# Patient Record
Sex: FEMALE | Race: WHITE | Employment: OTHER | URBAN - METROPOLITAN AREA
[De-identification: names, ages, dates, MRNs, and addresses within clinical notes are randomized per-mention and may not be internally consistent; named-entity substitution may affect disease eponyms.]

---

## 2021-10-06 ENCOUNTER — ANESTHESIA EVENT (OUTPATIENT)
Dept: OPERATING ROOM | Age: 76
DRG: 473 | End: 2021-10-06
Payer: MEDICARE

## 2021-10-06 NOTE — PROGRESS NOTES
PRE-OP INSTRUCTIONS FOR THE SURGICAL PATIENT YOU ARE UNABLE TO MAKE CONTACT FOR AN INTERVIEW:    All patients having surgery or anesthesia are required to be Covid tested OR to have been vaccinated at least 14 days prior to your procedure. It is very important to return our call to [phone] and notify the staff of your last vaccination date otherwise you will be required to complete Covid PCR test within the 5-6 days prior to surgery & quarantine. The results will need to be faxed to PreAdmission Testing at [phone]. 1. Follow all instructions provided to you from your surgeons office, including your ARRIVAL TIME. 2. Enter the MAIN entrance located on [address] and report to the desk. 3. Bring your insurance & photo ID with you. You may also be asked to pay a co-pay, as you may want to bring a check or credit card with you. 4. Leave all other valuables at home. 5. Arrange for someone to drive you home and be with you for the first 24 hours after discharge. 6. Bring your medication list with you day of surgery with doses and frequency listed (including over the counter medications)  7. You must contact your surgeon for Instructions regarding:              - ALL medication instructions, especially if taking blood thinners, aspirin, or diabetic medication.         -Bariatric patients call surgeon if on diabetic medications as some need to be stopped 1 week preop  - IF  there is a change in your physical condition such as a cold, fever, rash, cuts, sores or any other infection, especially near your surgical site. 8. A Pre-op History and Physical for surgery MUST be completed by your Physician or an Urgent Care within 30 days of your procedure date. Please bring a copy with you on the day of your procedure and along with any other testing performed. 9. DO NOT EAT ANYTHING eight hours prior to arrival for surgery.   May have up to 8 ounces of water 4 hours prior to arrival for surgery. NOTE: ALL Gastric, Bariatric and Bowel surgery patients MUST follow their surgeon's instructions. 10. No gum, candy, mints, or ice chips day of procedure. 11. Please refrain from drinking alcohol the day before or day of your procedure. 12. Please do not smoke the day of your procedure. 13. Dress in loose, comfortable clothing appropriate for redressing after your procedure. Do not wear jewelry (including body piercings), make-up, fingernail polish, lotion, powders or metal hairclips. 15. Contacts will need to be removed prior to surgery. You may want to bring your eye glasses to wear immediately before and after surgery. 14. Dentures will need to be removed before your procedure. 13. Bring cases for your glasses, contacts, dentures, or hearing aids to protect them while you are in surgery. 16. If you use a CPAP, please bring it with you on the day of your procedure. 17. Do not shave or wax for 72 hours prior to procedure near your operative site  18. FOR WOMAN OF CHILDBEARING AGE ONLY- please make sure we can collect a urine sample on arrival.     If you have further questions, you may contact your surgeon's office or us at [phone]     Left instructions on patient's voicemail.     Citlalli Bustamante RN.10/6/2021 .10:03 AM

## 2021-10-07 ENCOUNTER — APPOINTMENT (OUTPATIENT)
Dept: GENERAL RADIOLOGY | Age: 76
DRG: 473 | End: 2021-10-07
Attending: NEUROLOGICAL SURGERY
Payer: MEDICARE

## 2021-10-07 ENCOUNTER — HOSPITAL ENCOUNTER (INPATIENT)
Age: 76
LOS: 1 days | Discharge: HOME HEALTH | DRG: 473 | End: 2021-10-09
Attending: NEUROLOGICAL SURGERY | Admitting: NEUROLOGICAL SURGERY
Payer: MEDICARE

## 2021-10-07 ENCOUNTER — ANESTHESIA (OUTPATIENT)
Dept: OPERATING ROOM | Age: 76
DRG: 473 | End: 2021-10-07
Payer: MEDICARE

## 2021-10-07 VITALS — TEMPERATURE: 96.1 F | DIASTOLIC BLOOD PRESSURE: 78 MMHG | OXYGEN SATURATION: 99 % | SYSTOLIC BLOOD PRESSURE: 147 MMHG

## 2021-10-07 DIAGNOSIS — M48.02 CERVICAL STENOSIS OF SPINAL CANAL: Primary | ICD-10-CM

## 2021-10-07 PROCEDURE — 2580000003 HC RX 258: Performed by: NEUROLOGICAL SURGERY

## 2021-10-07 PROCEDURE — 2580000003 HC RX 258: Performed by: NURSE ANESTHETIST, CERTIFIED REGISTERED

## 2021-10-07 PROCEDURE — 6360000002 HC RX W HCPCS: Performed by: ANESTHESIOLOGY

## 2021-10-07 PROCEDURE — 6360000002 HC RX W HCPCS: Performed by: NURSE ANESTHETIST, CERTIFIED REGISTERED

## 2021-10-07 PROCEDURE — 01N10ZZ RELEASE CERVICAL NERVE, OPEN APPROACH: ICD-10-PCS | Performed by: NEUROLOGICAL SURGERY

## 2021-10-07 PROCEDURE — 2500000003 HC RX 250 WO HCPCS: Performed by: NURSE ANESTHETIST, CERTIFIED REGISTERED

## 2021-10-07 PROCEDURE — 0RT30ZZ RESECTION OF CERVICAL VERTEBRAL DISC, OPEN APPROACH: ICD-10-PCS | Performed by: NEUROLOGICAL SURGERY

## 2021-10-07 PROCEDURE — 00NW0ZZ RELEASE CERVICAL SPINAL CORD, OPEN APPROACH: ICD-10-PCS | Performed by: NEUROLOGICAL SURGERY

## 2021-10-07 PROCEDURE — 3600000004 HC SURGERY LEVEL 4 BASE: Performed by: NEUROLOGICAL SURGERY

## 2021-10-07 PROCEDURE — 3700000001 HC ADD 15 MINUTES (ANESTHESIA): Performed by: NEUROLOGICAL SURGERY

## 2021-10-07 PROCEDURE — 2500000003 HC RX 250 WO HCPCS: Performed by: ANESTHESIOLOGY

## 2021-10-07 PROCEDURE — 2580000003 HC RX 258: Performed by: PHYSICIAN ASSISTANT

## 2021-10-07 PROCEDURE — 2580000003 HC RX 258: Performed by: ANESTHESIOLOGY

## 2021-10-07 PROCEDURE — 51798 US URINE CAPACITY MEASURE: CPT

## 2021-10-07 PROCEDURE — 2500000003 HC RX 250 WO HCPCS: Performed by: PHYSICIAN ASSISTANT

## 2021-10-07 PROCEDURE — 0RG20A0 FUSION OF 2 OR MORE CERVICAL VERTEBRAL JOINTS WITH INTERBODY FUSION DEVICE, ANTERIOR APPROACH, ANTERIOR COLUMN, OPEN APPROACH: ICD-10-PCS | Performed by: NEUROLOGICAL SURGERY

## 2021-10-07 PROCEDURE — 6360000002 HC RX W HCPCS: Performed by: PHYSICIAN ASSISTANT

## 2021-10-07 PROCEDURE — C1713 ANCHOR/SCREW BN/BN,TIS/BN: HCPCS | Performed by: NEUROLOGICAL SURGERY

## 2021-10-07 PROCEDURE — 2720000010 HC SURG SUPPLY STERILE: Performed by: NEUROLOGICAL SURGERY

## 2021-10-07 PROCEDURE — 6370000000 HC RX 637 (ALT 250 FOR IP): Performed by: PHYSICIAN ASSISTANT

## 2021-10-07 PROCEDURE — 72040 X-RAY EXAM NECK SPINE 2-3 VW: CPT

## 2021-10-07 PROCEDURE — 6360000002 HC RX W HCPCS: Performed by: NEUROLOGICAL SURGERY

## 2021-10-07 PROCEDURE — G0378 HOSPITAL OBSERVATION PER HR: HCPCS

## 2021-10-07 PROCEDURE — 6370000000 HC RX 637 (ALT 250 FOR IP): Performed by: INTERNAL MEDICINE

## 2021-10-07 PROCEDURE — 3209999900 FLUORO FOR SURGICAL PROCEDURES

## 2021-10-07 PROCEDURE — 3700000000 HC ANESTHESIA ATTENDED CARE: Performed by: NEUROLOGICAL SURGERY

## 2021-10-07 PROCEDURE — 2500000003 HC RX 250 WO HCPCS: Performed by: NEUROLOGICAL SURGERY

## 2021-10-07 PROCEDURE — 7100000000 HC PACU RECOVERY - FIRST 15 MIN: Performed by: NEUROLOGICAL SURGERY

## 2021-10-07 PROCEDURE — 7100000001 HC PACU RECOVERY - ADDTL 15 MIN: Performed by: NEUROLOGICAL SURGERY

## 2021-10-07 PROCEDURE — 2709999900 HC NON-CHARGEABLE SUPPLY: Performed by: NEUROLOGICAL SURGERY

## 2021-10-07 PROCEDURE — 3600000014 HC SURGERY LEVEL 4 ADDTL 15MIN: Performed by: NEUROLOGICAL SURGERY

## 2021-10-07 PROCEDURE — 2780000010 HC IMPLANT OTHER: Performed by: NEUROLOGICAL SURGERY

## 2021-10-07 PROCEDURE — 6370000000 HC RX 637 (ALT 250 FOR IP): Performed by: NURSE PRACTITIONER

## 2021-10-07 DEVICE — IMPLANTABLE DEVICE
Type: IMPLANTABLE DEVICE | Site: SPINE CERVICAL | Status: FUNCTIONAL
Brand: VISTA®-S

## 2021-10-07 DEVICE — IMPLANTABLE DEVICE
Type: IMPLANTABLE DEVICE | Site: SPINE CERVICAL | Status: FUNCTIONAL
Brand: TRINICA® TRINICA®

## 2021-10-07 DEVICE — GRAFT BONE PRIMAGEN 5CC: Type: IMPLANTABLE DEVICE | Site: SPINE CERVICAL | Status: FUNCTIONAL

## 2021-10-07 DEVICE — IMPLANTABLE DEVICE
Type: IMPLANTABLE DEVICE | Site: SPINE CERVICAL | Status: FUNCTIONAL
Brand: TRINICA®

## 2021-10-07 RX ORDER — OXYCODONE HYDROCHLORIDE 5 MG/1
10 TABLET ORAL PRN
Status: DISCONTINUED | OUTPATIENT
Start: 2021-10-07 | End: 2021-10-07 | Stop reason: HOSPADM

## 2021-10-07 RX ORDER — SODIUM CHLORIDE 0.9 % (FLUSH) 0.9 %
10 SYRINGE (ML) INJECTION PRN
Status: DISCONTINUED | OUTPATIENT
Start: 2021-10-07 | End: 2021-10-09 | Stop reason: HOSPADM

## 2021-10-07 RX ORDER — MIDAZOLAM HYDROCHLORIDE 1 MG/ML
INJECTION INTRAMUSCULAR; INTRAVENOUS PRN
Status: DISCONTINUED | OUTPATIENT
Start: 2021-10-07 | End: 2021-10-07 | Stop reason: SDUPTHER

## 2021-10-07 RX ORDER — GLYCOPYRROLATE 1 MG/5 ML
SYRINGE (ML) INTRAVENOUS PRN
Status: DISCONTINUED | OUTPATIENT
Start: 2021-10-07 | End: 2021-10-07 | Stop reason: SDUPTHER

## 2021-10-07 RX ORDER — ONDANSETRON 2 MG/ML
INJECTION INTRAMUSCULAR; INTRAVENOUS PRN
Status: DISCONTINUED | OUTPATIENT
Start: 2021-10-07 | End: 2021-10-07 | Stop reason: SDUPTHER

## 2021-10-07 RX ORDER — LOSARTAN POTASSIUM 100 MG/1
100 TABLET ORAL DAILY
COMMUNITY

## 2021-10-07 RX ORDER — PHENYLEPHRINE HYDROCHLORIDE 10 MG/ML
INJECTION INTRAVENOUS PRN
Status: DISCONTINUED | OUTPATIENT
Start: 2021-10-07 | End: 2021-10-07 | Stop reason: SDUPTHER

## 2021-10-07 RX ORDER — MORPHINE SULFATE 4 MG/ML
1 INJECTION, SOLUTION INTRAMUSCULAR; INTRAVENOUS EVERY 5 MIN PRN
Status: DISCONTINUED | OUTPATIENT
Start: 2021-10-07 | End: 2021-10-07 | Stop reason: HOSPADM

## 2021-10-07 RX ORDER — DIPHENHYDRAMINE HYDROCHLORIDE 50 MG/ML
12.5 INJECTION INTRAMUSCULAR; INTRAVENOUS
Status: DISCONTINUED | OUTPATIENT
Start: 2021-10-07 | End: 2021-10-07 | Stop reason: HOSPADM

## 2021-10-07 RX ORDER — METHOCARBAMOL 500 MG/1
500 TABLET, FILM COATED ORAL 4 TIMES DAILY PRN
Status: DISCONTINUED | OUTPATIENT
Start: 2021-10-08 | End: 2021-10-09 | Stop reason: HOSPADM

## 2021-10-07 RX ORDER — SENNA AND DOCUSATE SODIUM 50; 8.6 MG/1; MG/1
1 TABLET, FILM COATED ORAL 2 TIMES DAILY
Status: DISCONTINUED | OUTPATIENT
Start: 2021-10-07 | End: 2021-10-09 | Stop reason: HOSPADM

## 2021-10-07 RX ORDER — POLYETHYLENE GLYCOL 3350 17 G/17G
17 POWDER, FOR SOLUTION ORAL DAILY PRN
Status: DISCONTINUED | OUTPATIENT
Start: 2021-10-07 | End: 2021-10-07

## 2021-10-07 RX ORDER — POLYETHYLENE GLYCOL 3350 17 G/17G
17 POWDER, FOR SOLUTION ORAL DAILY
Status: DISCONTINUED | OUTPATIENT
Start: 2021-10-07 | End: 2021-10-09 | Stop reason: HOSPADM

## 2021-10-07 RX ORDER — ACETAMINOPHEN 325 MG/1
650 TABLET ORAL EVERY 6 HOURS
Status: DISCONTINUED | OUTPATIENT
Start: 2021-10-07 | End: 2021-10-09 | Stop reason: HOSPADM

## 2021-10-07 RX ORDER — SODIUM PHOSPHATE, DIBASIC AND SODIUM PHOSPHATE, MONOBASIC 7; 19 G/133ML; G/133ML
1 ENEMA RECTAL DAILY PRN
Status: DISCONTINUED | OUTPATIENT
Start: 2021-10-07 | End: 2021-10-09 | Stop reason: HOSPADM

## 2021-10-07 RX ORDER — HEPARIN SODIUM 5000 [USP'U]/.5ML
5000 INJECTION, SOLUTION INTRAVENOUS; SUBCUTANEOUS EVERY 8 HOURS
Status: DISCONTINUED | OUTPATIENT
Start: 2021-10-08 | End: 2021-10-07

## 2021-10-07 RX ORDER — DEXAMETHASONE SODIUM PHOSPHATE 4 MG/ML
INJECTION, SOLUTION INTRA-ARTICULAR; INTRALESIONAL; INTRAMUSCULAR; INTRAVENOUS; SOFT TISSUE PRN
Status: DISCONTINUED | OUTPATIENT
Start: 2021-10-07 | End: 2021-10-07 | Stop reason: SDUPTHER

## 2021-10-07 RX ORDER — PROMETHAZINE HYDROCHLORIDE 25 MG/ML
6.25 INJECTION, SOLUTION INTRAMUSCULAR; INTRAVENOUS
Status: DISCONTINUED | OUTPATIENT
Start: 2021-10-07 | End: 2021-10-07 | Stop reason: HOSPADM

## 2021-10-07 RX ORDER — HYDRALAZINE HYDROCHLORIDE 20 MG/ML
10 INJECTION INTRAMUSCULAR; INTRAVENOUS EVERY 6 HOURS PRN
Status: DISCONTINUED | OUTPATIENT
Start: 2021-10-07 | End: 2021-10-09 | Stop reason: HOSPADM

## 2021-10-07 RX ORDER — PROPOFOL 10 MG/ML
INJECTION, EMULSION INTRAVENOUS CONTINUOUS PRN
Status: DISCONTINUED | OUTPATIENT
Start: 2021-10-07 | End: 2021-10-07 | Stop reason: SDUPTHER

## 2021-10-07 RX ORDER — SODIUM CHLORIDE, SODIUM LACTATE, POTASSIUM CHLORIDE, CALCIUM CHLORIDE 600; 310; 30; 20 MG/100ML; MG/100ML; MG/100ML; MG/100ML
INJECTION, SOLUTION INTRAVENOUS CONTINUOUS PRN
Status: DISCONTINUED | OUTPATIENT
Start: 2021-10-07 | End: 2021-10-07 | Stop reason: SDUPTHER

## 2021-10-07 RX ORDER — FENTANYL CITRATE 50 UG/ML
INJECTION, SOLUTION INTRAMUSCULAR; INTRAVENOUS PRN
Status: DISCONTINUED | OUTPATIENT
Start: 2021-10-07 | End: 2021-10-07 | Stop reason: SDUPTHER

## 2021-10-07 RX ORDER — OXYCODONE HYDROCHLORIDE 5 MG/1
10 TABLET ORAL EVERY 4 HOURS PRN
Status: DISCONTINUED | OUTPATIENT
Start: 2021-10-07 | End: 2021-10-09 | Stop reason: HOSPADM

## 2021-10-07 RX ORDER — OXYCODONE HYDROCHLORIDE 5 MG/1
5 TABLET ORAL PRN
Status: DISCONTINUED | OUTPATIENT
Start: 2021-10-07 | End: 2021-10-07 | Stop reason: HOSPADM

## 2021-10-07 RX ORDER — PROMETHAZINE HYDROCHLORIDE 12.5 MG/1
12.5 TABLET ORAL EVERY 6 HOURS PRN
Status: DISCONTINUED | OUTPATIENT
Start: 2021-10-07 | End: 2021-10-09 | Stop reason: HOSPADM

## 2021-10-07 RX ORDER — VITAMIN E 268 MG
400 CAPSULE ORAL DAILY
Status: ON HOLD | COMMUNITY
End: 2021-10-09 | Stop reason: HOSPADM

## 2021-10-07 RX ORDER — BENZONATATE 100 MG/1
100 CAPSULE ORAL 3 TIMES DAILY PRN
Status: DISCONTINUED | OUTPATIENT
Start: 2021-10-07 | End: 2021-10-09 | Stop reason: HOSPADM

## 2021-10-07 RX ORDER — LABETALOL HYDROCHLORIDE 5 MG/ML
5 INJECTION, SOLUTION INTRAVENOUS EVERY 10 MIN PRN
Status: DISCONTINUED | OUTPATIENT
Start: 2021-10-07 | End: 2021-10-07 | Stop reason: HOSPADM

## 2021-10-07 RX ORDER — LIDOCAINE HCL/PF 100 MG/5ML
SYRINGE (ML) INJECTION PRN
Status: DISCONTINUED | OUTPATIENT
Start: 2021-10-07 | End: 2021-10-07 | Stop reason: SDUPTHER

## 2021-10-07 RX ORDER — SUCCINYLCHOLINE/SOD CL,ISO/PF 200MG/10ML
SYRINGE (ML) INTRAVENOUS PRN
Status: DISCONTINUED | OUTPATIENT
Start: 2021-10-07 | End: 2021-10-07 | Stop reason: SDUPTHER

## 2021-10-07 RX ORDER — SODIUM CHLORIDE, SODIUM LACTATE, POTASSIUM CHLORIDE, CALCIUM CHLORIDE 600; 310; 30; 20 MG/100ML; MG/100ML; MG/100ML; MG/100ML
INJECTION, SOLUTION INTRAVENOUS CONTINUOUS
Status: DISCONTINUED | OUTPATIENT
Start: 2021-10-07 | End: 2021-10-08

## 2021-10-07 RX ORDER — METOCLOPRAMIDE HYDROCHLORIDE 5 MG/ML
10 INJECTION INTRAMUSCULAR; INTRAVENOUS
Status: COMPLETED | OUTPATIENT
Start: 2021-10-07 | End: 2021-10-07

## 2021-10-07 RX ORDER — OXYCODONE HYDROCHLORIDE 5 MG/1
5 TABLET ORAL EVERY 4 HOURS PRN
Status: DISCONTINUED | OUTPATIENT
Start: 2021-10-07 | End: 2021-10-09 | Stop reason: HOSPADM

## 2021-10-07 RX ORDER — SODIUM CHLORIDE 0.9 % (FLUSH) 0.9 %
10 SYRINGE (ML) INJECTION EVERY 12 HOURS SCHEDULED
Status: DISCONTINUED | OUTPATIENT
Start: 2021-10-07 | End: 2021-10-09 | Stop reason: HOSPADM

## 2021-10-07 RX ORDER — ROCURONIUM BROMIDE 10 MG/ML
INJECTION, SOLUTION INTRAVENOUS PRN
Status: DISCONTINUED | OUTPATIENT
Start: 2021-10-07 | End: 2021-10-07 | Stop reason: SDUPTHER

## 2021-10-07 RX ORDER — SODIUM CHLORIDE, SODIUM LACTATE, POTASSIUM CHLORIDE, CALCIUM CHLORIDE 600; 310; 30; 20 MG/100ML; MG/100ML; MG/100ML; MG/100ML
INJECTION, SOLUTION INTRAVENOUS CONTINUOUS
Status: DISCONTINUED | OUTPATIENT
Start: 2021-10-07 | End: 2021-10-07

## 2021-10-07 RX ORDER — ONDANSETRON 2 MG/ML
4 INJECTION INTRAMUSCULAR; INTRAVENOUS EVERY 6 HOURS PRN
Status: DISCONTINUED | OUTPATIENT
Start: 2021-10-07 | End: 2021-10-09 | Stop reason: HOSPADM

## 2021-10-07 RX ORDER — LOSARTAN POTASSIUM 25 MG/1
100 TABLET ORAL DAILY
Status: DISCONTINUED | OUTPATIENT
Start: 2021-10-07 | End: 2021-10-09 | Stop reason: HOSPADM

## 2021-10-07 RX ORDER — MEPERIDINE HYDROCHLORIDE 25 MG/ML
12.5 INJECTION INTRAMUSCULAR; INTRAVENOUS; SUBCUTANEOUS EVERY 5 MIN PRN
Status: DISCONTINUED | OUTPATIENT
Start: 2021-10-07 | End: 2021-10-07 | Stop reason: HOSPADM

## 2021-10-07 RX ORDER — SODIUM CHLORIDE 9 MG/ML
25 INJECTION, SOLUTION INTRAVENOUS PRN
Status: DISCONTINUED | OUTPATIENT
Start: 2021-10-07 | End: 2021-10-09 | Stop reason: HOSPADM

## 2021-10-07 RX ORDER — HEPARIN SODIUM 5000 [USP'U]/ML
5000 INJECTION, SOLUTION INTRAVENOUS; SUBCUTANEOUS EVERY 8 HOURS
Status: DISCONTINUED | OUTPATIENT
Start: 2021-10-08 | End: 2021-10-09 | Stop reason: HOSPADM

## 2021-10-07 RX ORDER — HYDRALAZINE HYDROCHLORIDE 20 MG/ML
5 INJECTION INTRAMUSCULAR; INTRAVENOUS EVERY 10 MIN PRN
Status: DISCONTINUED | OUTPATIENT
Start: 2021-10-07 | End: 2021-10-07 | Stop reason: HOSPADM

## 2021-10-07 RX ORDER — LIDOCAINE HYDROCHLORIDE AND EPINEPHRINE 10; 10 MG/ML; UG/ML
INJECTION, SOLUTION INFILTRATION; PERINEURAL PRN
Status: DISCONTINUED | OUTPATIENT
Start: 2021-10-07 | End: 2021-10-07 | Stop reason: ALTCHOICE

## 2021-10-07 RX ORDER — PROPOFOL 10 MG/ML
INJECTION, EMULSION INTRAVENOUS PRN
Status: DISCONTINUED | OUTPATIENT
Start: 2021-10-07 | End: 2021-10-07 | Stop reason: SDUPTHER

## 2021-10-07 RX ADMIN — DOCUSATE SODIUM 50 MG AND SENNOSIDES 8.6 MG 1 TABLET: 8.6; 5 TABLET, FILM COATED ORAL at 12:46

## 2021-10-07 RX ADMIN — SODIUM CHLORIDE, POTASSIUM CHLORIDE, SODIUM LACTATE AND CALCIUM CHLORIDE: 600; 310; 30; 20 INJECTION, SOLUTION INTRAVENOUS at 09:15

## 2021-10-07 RX ADMIN — METHOCARBAMOL 500 MG: 100 INJECTION, SOLUTION INTRAMUSCULAR; INTRAVENOUS at 23:24

## 2021-10-07 RX ADMIN — Medication 140 MG: at 07:44

## 2021-10-07 RX ADMIN — DEXAMETHASONE SODIUM PHOSPHATE 10 MG: 4 INJECTION, SOLUTION INTRAMUSCULAR; INTRAVENOUS at 08:05

## 2021-10-07 RX ADMIN — FENTANYL CITRATE 25 MCG: 50 INJECTION, SOLUTION INTRAMUSCULAR; INTRAVENOUS at 07:44

## 2021-10-07 RX ADMIN — ACETAMINOPHEN 650 MG: 325 TABLET ORAL at 12:46

## 2021-10-07 RX ADMIN — SODIUM CHLORIDE, POTASSIUM CHLORIDE, SODIUM LACTATE AND CALCIUM CHLORIDE: 600; 310; 30; 20 INJECTION, SOLUTION INTRAVENOUS at 06:21

## 2021-10-07 RX ADMIN — LOSARTAN POTASSIUM 100 MG: 25 TABLET, FILM COATED ORAL at 12:46

## 2021-10-07 RX ADMIN — ACETAMINOPHEN 650 MG: 325 TABLET ORAL at 18:29

## 2021-10-07 RX ADMIN — PHENYLEPHRINE HYDROCHLORIDE 80 MCG: 10 INJECTION INTRAVENOUS at 08:00

## 2021-10-07 RX ADMIN — PROPOFOL 120 MCG/KG/MIN: 10 INJECTION, EMULSION INTRAVENOUS at 07:55

## 2021-10-07 RX ADMIN — SODIUM CHLORIDE, POTASSIUM CHLORIDE, SODIUM LACTATE AND CALCIUM CHLORIDE: 600; 310; 30; 20 INJECTION, SOLUTION INTRAVENOUS at 11:38

## 2021-10-07 RX ADMIN — ACETAMINOPHEN 650 MG: 325 TABLET ORAL at 23:24

## 2021-10-07 RX ADMIN — METHOCARBAMOL 500 MG: 100 INJECTION, SOLUTION INTRAMUSCULAR; INTRAVENOUS at 16:16

## 2021-10-07 RX ADMIN — FENTANYL CITRATE 75 MCG: 50 INJECTION, SOLUTION INTRAMUSCULAR; INTRAVENOUS at 08:08

## 2021-10-07 RX ADMIN — CEFAZOLIN 2000 MG: 10 INJECTION, POWDER, FOR SOLUTION INTRAVENOUS at 15:13

## 2021-10-07 RX ADMIN — Medication 0.2 MG: at 07:26

## 2021-10-07 RX ADMIN — REMIFENTANIL HYDROCHLORIDE 0.1 MCG/KG/MIN: 1 INJECTION, POWDER, LYOPHILIZED, FOR SOLUTION INTRAVENOUS at 07:55

## 2021-10-07 RX ADMIN — LABETALOL HYDROCHLORIDE 5 MG: 5 INJECTION, SOLUTION INTRAVENOUS at 10:54

## 2021-10-07 RX ADMIN — METOCLOPRAMIDE HYDROCHLORIDE 10 MG: 5 INJECTION INTRAMUSCULAR; INTRAVENOUS at 10:22

## 2021-10-07 RX ADMIN — Medication 100 MG: at 07:43

## 2021-10-07 RX ADMIN — METHOCARBAMOL 500 MG: 100 INJECTION, SOLUTION INTRAMUSCULAR; INTRAVENOUS at 10:20

## 2021-10-07 RX ADMIN — PROPOFOL 200 MG: 10 INJECTION, EMULSION INTRAVENOUS at 07:44

## 2021-10-07 RX ADMIN — MIDAZOLAM HYDROCHLORIDE 1 MG: 2 INJECTION, SOLUTION INTRAMUSCULAR; INTRAVENOUS at 07:26

## 2021-10-07 RX ADMIN — Medication 0.2 MG: at 09:15

## 2021-10-07 RX ADMIN — FAMOTIDINE 20 MG: 10 INJECTION, SOLUTION INTRAVENOUS at 12:45

## 2021-10-07 RX ADMIN — BENZONATATE 100 MG: 100 CAPSULE ORAL at 21:03

## 2021-10-07 RX ADMIN — OXYCODONE 5 MG: 5 TABLET ORAL at 15:13

## 2021-10-07 RX ADMIN — SODIUM CHLORIDE, POTASSIUM CHLORIDE, SODIUM LACTATE AND CALCIUM CHLORIDE: 600; 310; 30; 20 INJECTION, SOLUTION INTRAVENOUS at 15:12

## 2021-10-07 RX ADMIN — Medication 10 ML: at 20:10

## 2021-10-07 RX ADMIN — ONDANSETRON 4 MG: 2 INJECTION INTRAMUSCULAR; INTRAVENOUS at 08:05

## 2021-10-07 RX ADMIN — HYDROMORPHONE HYDROCHLORIDE 0.5 MG: 1 INJECTION, SOLUTION INTRAMUSCULAR; INTRAVENOUS; SUBCUTANEOUS at 10:10

## 2021-10-07 RX ADMIN — ROCURONIUM BROMIDE 5 MG: 10 INJECTION INTRAVENOUS at 07:44

## 2021-10-07 RX ADMIN — PROPOFOL 130 MCG/KG/MIN: 10 INJECTION, EMULSION INTRAVENOUS at 09:00

## 2021-10-07 RX ADMIN — PHENYLEPHRINE HYDROCHLORIDE 80 MCG: 10 INJECTION INTRAVENOUS at 08:38

## 2021-10-07 RX ADMIN — HYDROMORPHONE HYDROCHLORIDE 0.25 MG: 1 INJECTION, SOLUTION INTRAMUSCULAR; INTRAVENOUS; SUBCUTANEOUS at 10:02

## 2021-10-07 RX ADMIN — HYDROMORPHONE HYDROCHLORIDE 0.25 MG: 1 INJECTION, SOLUTION INTRAMUSCULAR; INTRAVENOUS; SUBCUTANEOUS at 10:39

## 2021-10-07 RX ADMIN — POLYETHYLENE GLYCOL 3350 17 G: 17 POWDER, FOR SOLUTION ORAL at 12:45

## 2021-10-07 RX ADMIN — PHENYLEPHRINE HYDROCHLORIDE 80 MCG: 10 INJECTION INTRAVENOUS at 07:55

## 2021-10-07 RX ADMIN — ROCURONIUM BROMIDE 35 MG: 10 INJECTION INTRAVENOUS at 08:05

## 2021-10-07 RX ADMIN — SODIUM CHLORIDE, SODIUM LACTATE, POTASSIUM CHLORIDE, AND CALCIUM CHLORIDE: .6; .31; .03; .02 INJECTION, SOLUTION INTRAVENOUS at 07:55

## 2021-10-07 RX ADMIN — DOCUSATE SODIUM 50 MG AND SENNOSIDES 8.6 MG 1 TABLET: 8.6; 5 TABLET, FILM COATED ORAL at 20:09

## 2021-10-07 RX ADMIN — PHENYLEPHRINE HYDROCHLORIDE 80 MCG: 10 INJECTION INTRAVENOUS at 09:15

## 2021-10-07 RX ADMIN — FAMOTIDINE 20 MG: 10 INJECTION, SOLUTION INTRAVENOUS at 20:08

## 2021-10-07 RX ADMIN — PHENOL 1 SPRAY: 1.5 LIQUID ORAL at 20:08

## 2021-10-07 RX ADMIN — OXYCODONE 5 MG: 5 TABLET ORAL at 20:08

## 2021-10-07 RX ADMIN — SUGAMMADEX 100 MG: 100 INJECTION, SOLUTION INTRAVENOUS at 09:25

## 2021-10-07 ASSESSMENT — PULMONARY FUNCTION TESTS
PIF_VALUE: 22
PIF_VALUE: 23
PIF_VALUE: 23
PIF_VALUE: 24
PIF_VALUE: 24
PIF_VALUE: 23
PIF_VALUE: 22
PIF_VALUE: 24
PIF_VALUE: 23
PIF_VALUE: 25
PIF_VALUE: 8
PIF_VALUE: 24
PIF_VALUE: 23
PIF_VALUE: 24
PIF_VALUE: 23
PIF_VALUE: 23
PIF_VALUE: 22
PIF_VALUE: 23
PIF_VALUE: 22
PIF_VALUE: 23
PIF_VALUE: 24
PIF_VALUE: 23
PIF_VALUE: 23
PIF_VALUE: 18
PIF_VALUE: 23
PIF_VALUE: 3
PIF_VALUE: 16
PIF_VALUE: 23
PIF_VALUE: 22
PIF_VALUE: 23
PIF_VALUE: 4
PIF_VALUE: 22
PIF_VALUE: 23
PIF_VALUE: 24
PIF_VALUE: 23
PIF_VALUE: 22
PIF_VALUE: 12
PIF_VALUE: 23
PIF_VALUE: 23
PIF_VALUE: 25
PIF_VALUE: 23
PIF_VALUE: 24
PIF_VALUE: 23
PIF_VALUE: 22
PIF_VALUE: 22
PIF_VALUE: 23
PIF_VALUE: 23
PIF_VALUE: 22
PIF_VALUE: 22
PIF_VALUE: 23
PIF_VALUE: 23
PIF_VALUE: 4
PIF_VALUE: 23
PIF_VALUE: 24
PIF_VALUE: 23
PIF_VALUE: 24
PIF_VALUE: 23
PIF_VALUE: 23
PIF_VALUE: 24
PIF_VALUE: 23
PIF_VALUE: 22
PIF_VALUE: 23
PIF_VALUE: 23
PIF_VALUE: 24
PIF_VALUE: 24
PIF_VALUE: 23
PIF_VALUE: 24
PIF_VALUE: 23

## 2021-10-07 ASSESSMENT — PAIN SCALES - GENERAL
PAINLEVEL_OUTOF10: 2
PAINLEVEL_OUTOF10: 4
PAINLEVEL_OUTOF10: 4
PAINLEVEL_OUTOF10: 5
PAINLEVEL_OUTOF10: 7
PAINLEVEL_OUTOF10: 4
PAINLEVEL_OUTOF10: 5
PAINLEVEL_OUTOF10: 5
PAINLEVEL_OUTOF10: 4
PAINLEVEL_OUTOF10: 4
PAINLEVEL_OUTOF10: 2

## 2021-10-07 ASSESSMENT — PAIN DESCRIPTION - LOCATION
LOCATION: SHOULDER
LOCATION: NECK;SHOULDER
LOCATION: NECK;SHOULDER

## 2021-10-07 ASSESSMENT — PAIN DESCRIPTION - PAIN TYPE
TYPE: SURGICAL PAIN

## 2021-10-07 ASSESSMENT — PAIN - FUNCTIONAL ASSESSMENT
PAIN_FUNCTIONAL_ASSESSMENT: ACTIVITIES ARE NOT PREVENTED
PAIN_FUNCTIONAL_ASSESSMENT: 0-10

## 2021-10-07 ASSESSMENT — PAIN DESCRIPTION - DESCRIPTORS
DESCRIPTORS: SORE
DESCRIPTORS: ACHING;DULL;DISCOMFORT
DESCRIPTORS: SORE
DESCRIPTORS: ACHING

## 2021-10-07 ASSESSMENT — PAIN DESCRIPTION - PROGRESSION: CLINICAL_PROGRESSION: GRADUALLY WORSENING

## 2021-10-07 ASSESSMENT — PAIN DESCRIPTION - FREQUENCY: FREQUENCY: CONTINUOUS

## 2021-10-07 ASSESSMENT — PAIN DESCRIPTION - ORIENTATION
ORIENTATION: RIGHT;LEFT;ANTERIOR
ORIENTATION: RIGHT;LEFT
ORIENTATION: LEFT

## 2021-10-07 ASSESSMENT — PAIN DESCRIPTION - ONSET: ONSET: GRADUAL

## 2021-10-07 NOTE — CONSULTS
Hospital Medicine Initial Consultation    PCP: Dorina Black MD    Date of Admission: 10/7/2021    Date of Service: 10/7/2021    Pt seen/examined on 10/7/2021    Consulting Physician: Dr. Anselmo Patten: Neurosurgery    Chief Complaint:  Neck pain    History Of Present Illness:      64 y.o. female [facility] with chronic neck pain a/w left arm pain that is persistent despite conservative treatment. We are consulted for medical management after the following procedure:  ANTERIOR CERVICAL DISCECTOMY AND  FUSION C3/4, C4/5, C5/6    Past Medical History:      No past medical history on file. Past Surgical History:          Procedure Laterality Date    CERVICAL FUSION N/A 10/7/2021    ANTERIOR CERVICAL DISCECTOMY AND  FUSION C3/4, C4/5, C5/6 performed by Fahad Zamora MD at [address]       Medications Prior to Admission:      Prior to Admission medications    Medication Sig Start Date End Date Taking? Authorizing Provider   losartan (COZAAR) 100 MG tablet Take 100 mg by mouth daily   Yes Historical Provider, MD   vitamin E 400 UNIT capsule Take 400 Units by mouth daily   Yes Historical Provider, MD       Allergies:  Latex and Sulfa antibiotics    Social History:      TOBACCO:   reports that she quit smoking about 10 years ago. She started smoking about 49 years ago. She does not have any smokeless tobacco history on file. ETOH:   has no history on file for alcohol use. Family History:      Reviewed in detail and negative except as follows:    No family history on file. REVIEW OF SYSTEMS:   Pertinent positives and negatives as noted in the HPI. All other systems reviewed and negative.     PHYSICAL EXAM PERFORMED:    BP (!) 153/80   Pulse 75   Temp 97.6 °F (36.4 °C) (Oral)   Resp 16   Ht 5' 4\" (1.626 m)   Wt 213 lb (96.6 kg)   LMP  (LMP Unknown)   SpO2 98%   BMI 36.56 kg/m²     General appearance:  No apparent distress, appears stated age, anterior drain in place  Eyes: Pupils equal, round, reactive to light, conjunctiva/corneas clear  Ears/Nose/Mouth/Throat: No external lesions or scars, hearing intact to voice  Neck: Trachea midline, no masses noted, no thyromegaly  Respiratory:  Normal respiratory effort. Clear to auscultation bilaterally  Cardiovascular: Regular rate and rhythm, nl S1/S2, w/o murmurs, rubs or gallops, no lower extremity edema  Abdomen: Soft, non-tender, non-distended, no hepatosplenomegaly  Musculoskeletal: No cyanosis, clubbing or petechiae, no lower extremity misalignment, asymmetry, or crepitation  Skin: Normal skin color, texture, turgor. No rashes or lesions noted. Psychiatric: Alert and oriented x4, good insight and judgment    Labs:     No results for input(s): WBC, HGB, HCT, PLT in the last 72 hours. No results for input(s): NA, K, CL, CO2, BUN, CREATININE, CALCIUM, PHOS in the last 72 hours. Invalid input(s): MAGNES  No results for input(s): AST, ALT, BILIDIR, BILITOT, ALKPHOS in the last 72 hours. No results for input(s): INR in the last 72 hours. No results for input(s): Polo Sleeper in the last 72 hours. Urinalysis:    No results found for: Darrelyn Meals, BACTERIA, RBCUA, BLOODU, SPECGRAV, GLUCOSEU    Radiology:    [facility] FOR SURGICAL PROCEDURES   Final Result      Postsurgical changes of cervical spine fusion. XR CERVICAL SPINE (2-3 VIEWS)   Final Result      Postsurgical changes of cervical spine fusion. XR CERVICAL SPINE (2-3 VIEWS)    (Results Pending)       ASSESSMENT:    Active Hospital Problems    Diagnosis Date Noted    Cervical stenosis of spinal canal [M48.02] 10/07/2021       PLAN:    # ANTERIOR CERVICAL DISCECTOMY AND  FUSION C3/4, C4/5, C5/6  -management as per primary    # HTN  -home losartan, continue    # Other chronic conditions  -continue home management    DVT Prophylaxis: per primary  Diet: ADULT DIET;  Regular  Code Status: Full Code    PT/OT Eval Status: per primary    Dispo - per primary     Marcille Krabbe, MD    Thank you Dr. Sabi Rockwell for the opportunity to be involved in this patient's care.  If you have any questions or concerns please feel free to contact me at [phone]

## 2021-10-07 NOTE — ANESTHESIA POSTPROCEDURE EVALUATION
Department of Anesthesiology  Postprocedure Note    Patient: Kenzie Rockwell  MRN: [id number]  YOB: 1945  Date of evaluation: 10/7/2021  Time:  11:35 AM     Procedure Summary     Date: 10/07/21 Room / Location: [address] [place] [address]    Anesthesia Start: 4773 Anesthesia Stop: 4158    Procedure: ANTERIOR CERVICAL DISCECTOMY AND  FUSION C3/4, C4/5, C5/6 (N/A ) Diagnosis:       Cervical spinal stenosis      (Cervical spinal stenosis [M48.02])    Surgeons: Betzaida Modi MD Responsible Provider: Abdifatah Sommers MD    Anesthesia Type: general ASA Status: 3          Anesthesia Type: general    Gabrielle Phase I: Gabrielle Score: 9    Gabrielle Phase II:      Last vitals: Reviewed and per EMR flowsheets.        Anesthesia Post Evaluation    Patient location during evaluation: PACU  Patient participation: complete - patient participated  Level of consciousness: awake and alert  Pain score: 2  Airway patency: patent  Nausea & Vomiting: no nausea and no vomiting  Complications: no  Cardiovascular status: hemodynamically stable  Respiratory status: acceptable  Hydration status: euvolemic

## 2021-10-07 NOTE — ANESTHESIA PRE PROCEDURE
Department of Anesthesiology  Preprocedure Note       Name:  Emilee Boston   Age:  64 y.o.  :  1945                                          MRN:  [id number]         Date:  10/7/2021      Surgeon: Goldie Robbins):  Kacie Laboy MD    Procedure: Procedure(s):  ANTERIOR CERVICAL DISCECTOMY AND  FUSION C3/4, C4/5, C5/6    Medications prior to admission:   Prior to Admission medications    Medication Sig Start Date End Date Taking?  Authorizing Provider   losartan (COZAAR) 100 MG tablet Take 100 mg by mouth daily   Yes Historical Provider, MD   vitamin E 400 UNIT capsule Take 400 Units by mouth daily   Yes Historical Provider, MD       Current medications:    Current Facility-Administered Medications   Medication Dose Route Frequency Provider Last Rate Last Admin    ceFAZolin (ANCEF) 2000 mg in dextrose 5 % 50 mL IVPB  2,000 mg IntraVENous Once Kacie Laboy MD        lactated ringers infusion   IntraVENous Continuous Terrea Sonu,  mL/hr at 10/07/21 0621 New Bag at 10/07/21 0621    HYDROmorphone (DILAUDID) injection 0.25 mg  0.25 mg IntraVENous Q5 Min PRN Blease MD Coreen        HYDROmorphone (DILAUDID) injection 0.5 mg  0.5 mg IntraVENous Q5 Min PRN Blease MD Coreen        morphine injection 1 mg  1 mg IntraVENous Q5 Min PRN Blease MD Coreen        HYDROmorphone (DILAUDID) injection 0.5 mg  0.5 mg IntraVENous Q5 Min PRN Blease MD Coreen        oxyCODONE (ROXICODONE) immediate release tablet 5 mg  5 mg Oral PRN Linda Angela MD        Or    oxyCODONE (ROXICODONE) immediate release tablet 10 mg  10 mg Oral PRN Blease MD Coreen        diphenhydrAMINE (BENADRYL) injection 12.5 mg  12.5 mg IntraVENous Once PRN Blease MD Coreen        metoclopramide [facility]) injection 10 mg  10 mg IntraVENous Once PRN Blease MD Coreen        [facility]) injection 6.25 mg  6.25 mg IntraVENous Once PRN Blease MD Coreen        labetalol (NORMODYNE;TRANDATE) injection 5 mg ABGs: No results found for: PHART, PO2ART, JPF4MHY, JDE6QVT, BEART, W4NUYTJU     Type & Screen (If Applicable):  No results found for: LABABO, LABRH    Drug/Infectious Status (If Applicable):  No results found for: HIV, HEPCAB    COVID-19 Screening (If Applicable): No results found for: COVID19        Anesthesia Evaluation  Patient summary reviewed and Nursing notes reviewed no history of anesthetic complications:   Airway: Mallampati: II  TM distance: >3 FB   Neck ROM: full  Mouth opening: > = 3 FB Dental:          Pulmonary:Negative Pulmonary ROS                              Cardiovascular:    (+) hypertension:,                   Neuro/Psych:   Negative Neuro/Psych ROS              GI/Hepatic/Renal:             Endo/Other: Negative Endo/Other ROS                    Abdominal:             Vascular: Other Findings:             Anesthesia Plan      general     ASA 1    (18-year-old female presents for ANTERIOR CERVICAL DISCECTOMY AND  FUSION C3/4, C4/5, C5/6. Plan general anesthesia with ASA standard monitors. Questions answered. Patient agreeable with anesthetic plan.  )  Induction: intravenous. Anesthetic plan and risks discussed with patient. Plan discussed with CRNA.     Attending anesthesiologist reviewed and agrees with Yamilet Wren MD   10/7/2021

## 2021-10-07 NOTE — PROGRESS NOTES
4 Eyes Admission Assessment     I agree as the admission nurse that 2 RN's have performed a thorough Head to Toe Skin Assessment on the patient. ALL assessment sites listed below have been assessed on admission. Areas assessed by both nurses:   [x]   Head, Face, and Ears   [x]   Shoulders, Back, and Chest  [x]   Arms, Elbows, and Hands   [x]   Coccyx, Sacrum, and Ischium  [x]   Legs, Feet, and Heels        Does the Patient have Skin Breakdown?   No         Paco Prevention initiated:  No   Wound Care Orders initiated:  No      [facility] nurse consulted for Pressure Injury (Stage 3,4, Unstageable, DTI, NWPT, and Complex wounds) or Paco score 18 or lower:  No      Nurse 1 eSignature: Electronically signed by Sona Hoyos RN on 10/7/21 at 11:40 AM EDT    **SHARE this note so that the co-signing nurse is able to place an eSignature**    Nurse 2 eSignature: Electronically signed by Kyle Magallanes RN on 10/7/21 at 12:15 PM EDT

## 2021-10-07 NOTE — OP NOTE
Operative Note    PATIENT NAME:          Fred Riley  YOB: 1945   MEDICAL RECORD#         [id number]  SURGERY DATE:          10/7/2021  SURGEON:                 Godwin Patel MD                                                      OPERATIVE REPORT     PREOPERATIVE DIAGNOSIS:   1. Cervical spinal stenosis [M48.02]          2. Cervical Radiculopathy  3. Cervical Foraminal stenosis    POSTOPERATIVE DIAGNOSIS:      1. same    PROCEDUREs:   1. Decompressive anterior cervical discectomy at C3-4, C4-5 and C5-6 via anterior cervical approach  2. Microdissection using the operating room microscope. 3. Anterior cervical arthrodesis at C3-4, C4-5 and C5-6 .with PEEK graft packed with surgery site autograft and allograft. 4. Placement of  Oma plate at C5-3, C7-1 and C5-6  Fir C3-6 fixation. 5. Evokes  6. Flouroscopy    COMPLICATIONS:  None     ANESTHETIC:          General endotracheal.     ESTIMATED BLOOD LOSS:  75 cc. INDICATIONS:   Fred Riley is a 64 y.o. female  with neck pain and radiating extremity pain. The symptoms failed to respond to conservative intervention. An MRI scan confirmed a herniated cervical disc at C3-4, C4-5 and C5-6 with central and foraminal stenosis. Having failed conservative management and experiencing persistent symptoms, the patient elected to proceed ahead with anterior cervical discectomy with stabilization and fusion at C3-4, C4-5 and C5-6 . Luh Rodriguez Full informed consent of the operative risks, complications, and expected outcomes were discussed with the patient who fully acknowledged the understanding of these complications and consented to the operation. DESCRIPTION OF PROCEDURE:  The patient was brought to the operating room and placed under general anesthesia. EVOKES were started. The patient was then placed supine on the operating table.   The anterior cervical area was was with alcohol and then sterile prepped and draped in the usual sterile fashion. Time out confirmed the patient and procedure was accurate. All pressure points were padded prior to final positioning. X-rays were used to make an incision to approach C3-4, C4-5 and C5-6. Using a #15 blade knife, the skin was incised in a horizontal fashion over the C3-4, C4-5 and C5-6 interspaces. Monopolar cautery was used to open the platysma muscle. Blunt dissection was then used to develop the avascular plane between the sternocleidomastoid and the medial strap muscles exposing the prevertebral space at C3-4, C4-5 and C5-6. Lateral fluoroscopic imaging was used to confirm proper localization at this levels C3-4, C4-5 and C5-6. A self-retaining retractor was placed. The microscope was then brought in and used to assist with performing the microsurgical decompression. Using the high-speed betNOW Ru drill, I removed the anterior osteophytes at C3-4, C4-5 and C5-6. I then used pituitary forceps to remove the soft disk material at each level as well as the cartilaginous end plate material.  I then performed a foraminotomy at this level bilaterally C3-4, C4-5 and C5-6. The posterior longitudinal ligament was explored and a KeepGo hook was used to retrieve disk fragments. The posterior longitudinal ligament itself was opened with a 1 and 2 mm punch and removed in a piecemeal fashion. Both the central canal and the lateral nerve roots were decompressed bilaterally with uncutting at C3-4, C4-5 and C5-6. The wound was copiously irrigated with antibiotic solution. The endplates were then fashioned using a series of rasps to accept a lordotically shaped spacers      PEEK cages were selected and further packed with locally harvested autograft bone filings from the interspace drilling and allograft for anterior cervical arthrodesis. The cages were carefully impacted into the C3-4, C4-5 and C5-6 interspaces.   A Oma cervical spine plate was then placed across the interspaces for stabilization with fixed and variable screws in C3, C4, C5 and C6 for C3-6 fixation. X-rays confirmed good placement of all hardware. EVOKES were stable. A drain was placed. Hemostasis was confirmed. The wound was then closed in the usual fashion using running 3-0 Vicryl in the platysma and a running 4-0 Monocryl in the subcuticular layer. A sterile dressing was then applied. The patient was extubated in the operating room and transferred to the recovery room in sable condition. There were no complications. In accordance with CMS guidelines, I attest that I was present for the entire procedure from the creation of the skin incision to the closure. Angus Capone PA-C was the assistant for the procedure. She provided medically necessary assistance in patient positioning, prep and drape, intraoperative exposure and critical anatomical retraction, closure and postoperative care.        Nadia Abernathy MD

## 2021-10-07 NOTE — PROGRESS NOTES
PACU Transfer Note    Vitals:    10/07/21 1101   BP: (!) 153/83   Pulse: 81   Resp: 14   Temp: 97.1 °F (36.2 °C)   SpO2: 96%       In: 1654 [I.V.:1654]  Out: 90 [Drains:15]    Pain assessment:BP in range, BP meds given in pacu  Pain Level: 2    Report given to Receiving unit RN.    10/7/2021 11:04 AM

## 2021-10-07 NOTE — H&P
Angel Harvey    [id number]    [facility] Same Day Surgery Update H & P  Department of General Surgery   Surgical Service   Pre-operative History and Physical  Last H & P within the last 30 days. DIAGNOSIS:   Cervical spinal stenosis [M48.02]    Procedure(s):  ANTERIOR CERVICAL DISCECTOMY AND  FUSION C3/4, C4/5, C5/6     History obtained from: Patient interview and EHR     HISTORY OF PRESENT ILLNESS:   Patient with chronic cervical pain and left UE pain. The symptoms have been recalcitrant to conservative treatment and the patient presents today for the above procedure. Covid 19:  Patient denies fever, chills, worsening cough, or known exposure to Covid-19.       Past Medical History:     APPENDECTOMY 1987   With Hysterectomy @ [facility] 03/29/2017   MVS-Dizziness-See EPIC    Carpal Tunnel Release Left 09/11/2017   CARPAL TUNNEL RELEASE performed by Ora Basurto MD at [address] [facility] CH[facility] EGD 2017   Dr Jordan Jackson ECHO Historical 04/14/2016   MVS-Dizziness/Lightheaded-See EPIC    EGD/COLONOSCPY 2018   Dr Concepcion Goods Bilateral 01/16/2020   L3-4 per Dr Melisa Caban Other Surgical History 2004   Lumbar Back Surgery per Dr Jeevan Abdi @ [facility] Other Surgical History   3 right foot & 1 left foot    REMOVAL GALLBLADDER 1991   [address] [place] [address] +-RMVL TUBE +-RMVL OVARY 1987   [facility]       Past Surgical History:     Depression    Left carpal tunnel syndrome    Vasovagal episode    Hypoglycemia    De Quervain's disease (radial styloid tenosynovitis)    Sacroiliitis (HC CODE)    Major depressive disorder, recurrent, in remission, unspecified (HC CODE)    Morbid obesity (HC CODE)    Tear film insufficiency    Presbyopia    Nuclear senile cataract    Nonexudative age-related macular degeneration    Dry eyes    Convergence insufficiency    Combined form of senile cataract       Past Social History:  Social History Socioeconomic History    Marital status:      Spouse name: Not on file    Number of children: Not on file    Years of education: Not on file    Highest education level: Not on file   Occupational History    Not on file   Tobacco Use    Smoking status: Not on file   Substance and Sexual Activity    Alcohol use: Not on file    Drug use: Not on file    Sexual activity: Not on file   Other Topics Concern    Not on file   Social History Narrative    Not on file     Social Determinants of Health     Financial Resource Strain:     Difficulty of Paying Living Expenses:    Food Insecurity:     Worried About Running Out of Food in the Last Year:     [address] in the Last Year:    Transportation Needs:     Lack of Transportation (Medical):  Lack of Transportation (Non-Medical):    Physical Activity:     Days of Exercise per Week:     Minutes of Exercise per Session:    Stress:     Feeling of Stress :    Social Connections:     Frequency of Communication with Friends and Family:     Frequency of Social Gatherings with Friends and Family:     Attends [employer] Services:     Active Member of Clubs or Organizations:     Attends Club or Organization Meetings:     Marital Status:    Intimate Partner Violence:     Fear of Current or Ex-Partner:     Emotionally Abused:     Physically Abused:     Sexually Abused:          Medications Prior to Admission:      Prior to Admission medications    Medication Sig Start Date End Date Taking?  Authorizing Provider   losartan (COZAAR) 100 MG tablet Take 100 mg by mouth daily   Yes Historical Provider, MD   vitamin E 400 UNIT capsule Take 400 Units by mouth daily   Yes Historical Provider, MD         Allergies:  Latex and Sulfa antibiotics    PHYSICAL EXAM:      BP (!) 172/94   Pulse 84   Temp 96.6 °F (35.9 °C) (Temporal)   Resp 14   Ht 5' 4\" (1.626 m)   Wt 213 lb (96.6 kg)   LMP  (LMP Unknown)   SpO2 96%   BMI 36.56 kg/m²      Airway: Airway patent with no audible stridor    Heart:  Regular rate and rhythm, No murmur noted    Lungs:  No increased work of breathing, good air exchange, clear to auscultation bilaterally, no crackles or wheezing    Abdomen:  Soft, non-distended, non-tender, no masses palpated    ASSESSMENT AND PLAN    Patient is a 64 y.o. female with above specified procedure planned. 1.  The patients history and physical was obtained and signed off by the pre-admission testing department. Patient seen and focused exam done today- no new changes since last physical exam on 9/27/21    2. Access to ancillary services are available per request of the provider.     Jatin Poole, FAY - JEFF     10/7/2021

## 2021-10-07 NOTE — PROGRESS NOTES
Admitted to [id number]. Alert and oriented x 4. Denies numbness and tingling. Collar remains in place. Surgical glue without drainage. Moves al four extremities freely. Fall precautions in place.

## 2021-10-07 NOTE — PROGRESS NOTES
Pt arrived from OR, s/p ANTERIOR CERVICAL DISCECTOMY AND  FUSION C3/4, C4/5, C5/6, report received from CRNA. Cervical collar placed on arrival.  Pt able to move arms moderately and shoulder shrugs.   Pt c/o left shoulder pain and neck pain  General

## 2021-10-07 NOTE — CARE COORDINATION
Case Management Assessment           Initial Evaluation                Date / Time of Evaluation: 10/7/2021 4:17 PM                 Assessment Completed by: Sandra Parker RN    Patient Name: Walter Callaway     YOB: 1945  Diagnosis: Cervical spinal stenosis [M48.02]  Cervical stenosis of spinal canal [M48.02]     Date / Time: 10/7/2021  5:26 AM    Patient Admission Status: Inpatient    If patient is discharged prior to next notation, then this note serves as note for discharge by case management. Current PCP: Allan Jenkins MD  Clinic Patient: No    Chart Reviewed: Yes  Patient/ Family Interviewed: Yes    Initial assessment completed at bedside with: Patient and spouse    Hospitalization in the last 30 days: No    Emergency Contacts:  Extended Emergency Contact Information  Primary Emergency Contact: leila franklin  Home Phone: [phone]  Relation: Spouse  Secondary Emergency Contact: candido franklin  Home Phone: [phone]  Relation: Child    Advance Directives:   Code Status: Full Code    Healthcare Power of : No  Agent: NA  Contact Number: Na    Copy present: No     In paper Chart: No    Scanned into EMR No    Financial  Payor: Felipe Ar / Plan: Aminata Huang ESSENTIAL/PLUS / Product Type: *No Product type* /     Pre-cert required for SNF: No    Pharmacy    [id number] [facility], [address] [place] [address] Notice [phone] - F [phone]  [address] [place] [address]  Phone: [phone] Fax: [phone]      Potential assistance Purchasing Medications: Potential Assistance Purchasing Medications: No  Does Patient want to participate in local refill/ meds to beds program?: No    Meds To Beds General Rules:  1. Can ONLY be done Monday- Friday between 8:30am-5pm  2. Prescription(s) must be in pharmacy by 3pm to be filled same day  3. Copy of patient's insurance/ prescription drug card and patient face sheet must be sent along with the prescription(s)  4. Cost of Rx cannot be added to hospital bill. If financial assistance is needed, please contact unit  or ;  or  CANNOT provide pharmacy voucher for patients co-pays  5.  Patients can then  the prescription on their way out of the hospital at discharge, or pharmacy can deliver to the bedside if staff is available. (payment due at time of pick-up or delivery - cash, check, or card accepted)     Able to afford home medications/ co-pay costs: Yes    ADLS  Support Systems: Spouse/Significant Other    PT AM-PAC:     OT AM-PAC:       [place]: 2 story home, bedroom and bath on first floor  Steps: 2    Plans to RETURN to current housing: Yes  Barriers to RETURNING to current housin [address]  Currently ACTIVE with  [address]: No  Home Care Agency: Not Applicable    Currently ACTIVE with [place] [facility]: No  Passport/ Waiver: No  Passport/ Waiver Services: Not Applicable    : [facility] Phone: [address]  DME Provider: NA  Equipment: NA    Home Oxygen and [address] [place] prior to admission: [address]: Not Applicable  Other Respiratory Equipment: NA    Informed of need to bring portable home O2 tank on day of DISCHARGE for nursing to connect prior to leaving: No  Verbalized agreement/Understanding: No  Person to bring portable tank at discharge: NA    Dialysis  Active with HD/PD prior to admission: No  Nephrologist: [facility]    HD Center:  Not Applicable    DISCHARGE PLAN:  Disposition: Home with  [address]: [address] for discharge: family     Factors facilitating achievement of predicted outcomes: Family support, Motivated, Cooperative, Good insight into deficits and Has needed Durable Medical Equipment at home    Barriers to discharge: Pain    Additional Case Management Notes: CM met with patient and spouse at bedside. Patient indicates she is preparing to return home with home health through [facility] (recommended by Dr Jon Robert), patient has bedroom and bath on first floor with walk in shower and shower chair. No DME needed. Referral sent for home health services. The Plan for Transition of Care is related to the following treatment goals of Cervical spinal stenosis [M48.02]  Cervical stenosis of spinal canal [M48.02]    The Patient and/or patient representative Yeyo Dodge and her family were provided with a choice of provider and agrees with the discharge plan Yes    Freedom of choice list was provided with basic dialogue that supports the patient's individualized plan of care/goals and shares the quality data associated with the providers.  Yes    Care Transition patient: No    Chacorta Vickers, MARINA  [facility] [employer]  Case Management Department  Ph: [phone]

## 2021-10-07 NOTE — BRIEF OP NOTE
Brief Postoperative Note      Patient: Fred Riley  YOB: 1945  MRN: [id number]    Date of Procedure: 10/7/2021    Pre-Op Diagnosis: Cervical spinal stenosis [M48.02]    Post-Op Diagnosis: Same       Procedure(s):  ANTERIOR CERVICAL DISCECTOMY AND  FUSION C3/4, C4/5, C5/6    Surgeon(s):  Joce Khan MD    Assistant:  Physician Assistant: DINESH Escobedo    Anesthesia: General    Estimated Blood Loss (mL): less than 020     Complications: None    Specimens:   * No specimens in log *    Implants:  * No implants in log *      Drains:   Closed/Suction Drain Anterior Neck Bulb 15 Serbian (Active)       Findings: Stenosis    Electronically signed by Joce Khan MD on 10/7/2021 at 9:27 AM

## 2021-10-08 ENCOUNTER — APPOINTMENT (OUTPATIENT)
Dept: GENERAL RADIOLOGY | Age: 76
DRG: 473 | End: 2021-10-08
Attending: NEUROLOGICAL SURGERY
Payer: MEDICARE

## 2021-10-08 PROCEDURE — 6370000000 HC RX 637 (ALT 250 FOR IP): Performed by: PHYSICIAN ASSISTANT

## 2021-10-08 PROCEDURE — 72040 X-RAY EXAM NECK SPINE 2-3 VW: CPT

## 2021-10-08 PROCEDURE — 96372 THER/PROPH/DIAG INJ SC/IM: CPT

## 2021-10-08 PROCEDURE — 97166 OT EVAL MOD COMPLEX 45 MIN: CPT

## 2021-10-08 PROCEDURE — 97162 PT EVAL MOD COMPLEX 30 MIN: CPT

## 2021-10-08 PROCEDURE — 6370000000 HC RX 637 (ALT 250 FOR IP): Performed by: NURSE PRACTITIONER

## 2021-10-08 PROCEDURE — 6360000002 HC RX W HCPCS: Performed by: NURSE PRACTITIONER

## 2021-10-08 PROCEDURE — 1200000000 HC SEMI PRIVATE

## 2021-10-08 PROCEDURE — 96374 THER/PROPH/DIAG INJ IV PUSH: CPT

## 2021-10-08 PROCEDURE — 2580000003 HC RX 258: Performed by: PHYSICIAN ASSISTANT

## 2021-10-08 PROCEDURE — 96375 TX/PRO/DX INJ NEW DRUG ADDON: CPT

## 2021-10-08 PROCEDURE — 97530 THERAPEUTIC ACTIVITIES: CPT

## 2021-10-08 PROCEDURE — 96365 THER/PROPH/DIAG IV INF INIT: CPT

## 2021-10-08 PROCEDURE — 6370000000 HC RX 637 (ALT 250 FOR IP): Performed by: INTERNAL MEDICINE

## 2021-10-08 PROCEDURE — G0378 HOSPITAL OBSERVATION PER HR: HCPCS

## 2021-10-08 PROCEDURE — 6360000002 HC RX W HCPCS: Performed by: PHYSICIAN ASSISTANT

## 2021-10-08 PROCEDURE — 97116 GAIT TRAINING THERAPY: CPT

## 2021-10-08 PROCEDURE — 97535 SELF CARE MNGMENT TRAINING: CPT

## 2021-10-08 PROCEDURE — 2500000003 HC RX 250 WO HCPCS: Performed by: PHYSICIAN ASSISTANT

## 2021-10-08 RX ADMIN — OXYCODONE 5 MG: 5 TABLET ORAL at 18:24

## 2021-10-08 RX ADMIN — Medication 10 ML: at 20:45

## 2021-10-08 RX ADMIN — OXYCODONE 5 MG: 5 TABLET ORAL at 09:21

## 2021-10-08 RX ADMIN — DOCUSATE SODIUM 50 MG AND SENNOSIDES 8.6 MG 1 TABLET: 8.6; 5 TABLET, FILM COATED ORAL at 09:21

## 2021-10-08 RX ADMIN — BENZONATATE 100 MG: 100 CAPSULE ORAL at 05:29

## 2021-10-08 RX ADMIN — HEPARIN SODIUM 5000 UNITS: 5000 INJECTION INTRAVENOUS; SUBCUTANEOUS at 14:28

## 2021-10-08 RX ADMIN — FAMOTIDINE 20 MG: 10 INJECTION, SOLUTION INTRAVENOUS at 09:21

## 2021-10-08 RX ADMIN — CEFAZOLIN 2000 MG: 10 INJECTION, POWDER, FOR SOLUTION INTRAVENOUS at 00:31

## 2021-10-08 RX ADMIN — Medication 10 ML: at 09:22

## 2021-10-08 RX ADMIN — HYDRALAZINE HYDROCHLORIDE 10 MG: 20 INJECTION, SOLUTION INTRAMUSCULAR; INTRAVENOUS at 03:13

## 2021-10-08 RX ADMIN — HEPARIN SODIUM 5000 UNITS: 5000 INJECTION INTRAVENOUS; SUBCUTANEOUS at 22:39

## 2021-10-08 RX ADMIN — METHOCARBAMOL 500 MG: 100 INJECTION, SOLUTION INTRAMUSCULAR; INTRAVENOUS at 05:33

## 2021-10-08 RX ADMIN — ACETAMINOPHEN 650 MG: 325 TABLET ORAL at 18:24

## 2021-10-08 RX ADMIN — ACETAMINOPHEN 650 MG: 325 TABLET ORAL at 05:29

## 2021-10-08 RX ADMIN — FAMOTIDINE 20 MG: 10 INJECTION, SOLUTION INTRAVENOUS at 20:43

## 2021-10-08 RX ADMIN — LOSARTAN POTASSIUM 100 MG: 25 TABLET, FILM COATED ORAL at 09:21

## 2021-10-08 RX ADMIN — HYDRALAZINE HYDROCHLORIDE 10 MG: 20 INJECTION, SOLUTION INTRAMUSCULAR; INTRAVENOUS at 22:46

## 2021-10-08 RX ADMIN — ACETAMINOPHEN 650 MG: 325 TABLET ORAL at 11:58

## 2021-10-08 RX ADMIN — POLYETHYLENE GLYCOL 3350 17 G: 17 POWDER, FOR SOLUTION ORAL at 09:22

## 2021-10-08 RX ADMIN — DOCUSATE SODIUM 50 MG AND SENNOSIDES 8.6 MG 1 TABLET: 8.6; 5 TABLET, FILM COATED ORAL at 20:43

## 2021-10-08 RX ADMIN — HEPARIN SODIUM 5000 UNITS: 5000 INJECTION INTRAVENOUS; SUBCUTANEOUS at 05:28

## 2021-10-08 RX ADMIN — OXYCODONE 5 MG: 5 TABLET ORAL at 03:14

## 2021-10-08 ASSESSMENT — PAIN DESCRIPTION - DESCRIPTORS
DESCRIPTORS: ACHING;DISCOMFORT

## 2021-10-08 ASSESSMENT — PAIN DESCRIPTION - PROGRESSION
CLINICAL_PROGRESSION: GRADUALLY IMPROVING

## 2021-10-08 ASSESSMENT — PAIN SCALES - GENERAL
PAINLEVEL_OUTOF10: 5
PAINLEVEL_OUTOF10: 2
PAINLEVEL_OUTOF10: 5
PAINLEVEL_OUTOF10: 4
PAINLEVEL_OUTOF10: 4
PAINLEVEL_OUTOF10: 5
PAINLEVEL_OUTOF10: 2

## 2021-10-08 ASSESSMENT — PAIN DESCRIPTION - PAIN TYPE
TYPE: SURGICAL PAIN

## 2021-10-08 ASSESSMENT — PAIN - FUNCTIONAL ASSESSMENT
PAIN_FUNCTIONAL_ASSESSMENT: PREVENTS OR INTERFERES SOME ACTIVE ACTIVITIES AND ADLS

## 2021-10-08 ASSESSMENT — PAIN DESCRIPTION - FREQUENCY
FREQUENCY: CONTINUOUS

## 2021-10-08 ASSESSMENT — PAIN DESCRIPTION - ONSET
ONSET: ON-GOING
ONSET: ON-GOING
ONSET: GRADUAL

## 2021-10-08 ASSESSMENT — PAIN DESCRIPTION - LOCATION
LOCATION: NECK;SHOULDER

## 2021-10-08 ASSESSMENT — PAIN DESCRIPTION - ORIENTATION
ORIENTATION: RIGHT;LEFT;ANTERIOR

## 2021-10-08 NOTE — PROGRESS NOTES
Physical Therapy    [facility]  Initial Assessment/Treatment    NAME: Gumaro Rudolph  : 1945  MRN: [id number]    Date of Service: 10/8/2021    Discharge Recommendations: Gumaro Rudolph scored a 18/24 on the AM-PAC short mobility form. Current research shows that an AM-PAC score of 18 or greater is typically associated with a discharge to the patient's home setting. Based on the patient's AM-PAC score and their current functional mobility deficits, it is recommended that the patient have 2-3 sessions per week of Physical Therapy at d/c to increase the patient's independence. At this time, this patient demonstrates the endurance and safety to discharge home with ** (home vs OP services) and a follow up treatment frequency of 2-3x/wk. Please see assessment section for further patient specific details. If patient discharges prior to next session this note will serve as a discharge summary. Please see below for the latest assessment towards goals. PT Equipment Recommendations  Equipment Needed: No (has rolling walker at home)    Assessment   Body structures, Functions, Activity limitations: Decreased functional mobility ; Decreased balance  Assessment: 63 yo seen POD 1 from ANTERIOR CERVICAL DISCECTOMY AND  FUSION C3/4, C4/5, C5/6. Practiced walking with & without a walker. Demo steadier gait with walker & states she has one & will use at home. Anticipate good progress the more pt is up & moving. Demo good understanding of logrolling & need to wear cervical collar when OOB/sitting >45 degrees. Will follow & progress as tolerated. Plans for home at dc with spouse/family to A.   Treatment Diagnosis: impaired mobility  Prognosis: Good  Decision Making: Medium Complexity  PT Education: Goals;PT Role;Plan of Care;General Safety;Gait Training;Transfer Training  Patient Education: use of walker, dc recommendations- verbalized understanding  REQUIRES PT FOLLOW UP: Yes  Activity Tolerance  Activity Tolerance: Patient Tolerated treatment well  Activity Tolerance: O2 sats 96% on RA after walking       Patient Diagnosis(es): There were no encounter diagnoses. has no past medical history on file. has a past surgical history that includes cervical fusion (N/A, 10/7/2021). Restrictions  Position Activity Restriction  Other position/activity restrictions: C collar (- Cervical collar to be worn at all times when OOB or if HOB >45 degrees - Cervical collar does NOT need to be worn when eating, bathing or showering - Cervical collar pads to be cleaned with soap & water, air dried, and changed daily - OK to put gauze over incision to keep cervical collar from rubbing, if needed)  Vision/Hearing  Vision: Impaired  Vision Exceptions: Wears glasses at all times  Hearing: Within functional limits     Subjective  General  Chart Reviewed: Yes  Patient assessed for rehabilitation services?: Yes  Additional Pertinent Hx: s/p ANTERIOR CERVICAL DISCECTOMY AND  FUSION C3/4, C4/5, C5/6 on 10/7. Family / Caregiver Present: No  Referring Practitioner: DINESH Alvares  Diagnosis: cervical spinal stenosis  Follows Commands: Within Functional Limits  Subjective  Subjective: Found in bed, agreeable to therapy. Plans for home in day or two. Reports being a long hauler from [facility] in December. \"I still get short of breath. \"  Pain Screening  Patient Currently in Pain: Yes (5/10 neck pain, nurse aware)          Orientation  Orientation  Overall Orientation Status: Within Normal Limits  Social/Functional History  Social/Functional History  Lives With: Spouse  Type of Home: House  Home Layout: Two level, Able to Live on Main level with bedroom/bathroom, Performs ADL's on one level, Laundry in basement  Home Access: Stairs to enter with rails  Entrance Stairs - Number of Steps: 2  Bathroom Shower/Tub: Walk-in shower  Bathroom Toilet: Handicap height  Bathroom Equipment: Grab bars in shower, Grab bars around toilet, Hand-held shower, Shower chair  Bathroom Accessibility: Accessible  Home Equipment: Rolling walker, Cane, [address] Help From: Family (Son and daughter are able to help)  ADL Assistance: [address]: Independent  Homemaking Responsibilities: Yes (Cleaning, cooking.  does laundry)  Ambulation Assistance: Independent  Transfer Assistance: Independent  Active : Yes  Additional Comments: Pt denies falls       Objective          AROM RLE (degrees)  RLE AROM: WNL  AROM LLE (degrees)  LLE AROM : WNL  Strength RLE  Strength RLE: WNL  Strength LLE  Strength LLE: WNL        Bed mobility  Supine to Sit: Stand by assistance (cues for logroll; HOB elev;pt holding onto walker next to bed for support)  Sit to Supine: Stand by assistance (via logroll; pt holding walker next to bed for support; HOB elev)  Transfers  Sit to Stand: Stand by assistance  Stand to sit: Stand by assistance  Ambulation  Ambulation?: Yes  More Ambulation?: Yes  Ambulation 1  Surface: level tile  Device: No Device  Assistance: Contact guard assistance  Quality of Gait: slightly unsteady/weak appearing gait requiring light support  Distance: 180'  Ambulation 2  Surface - 2: level tile  Device 2: Rolling Walker  Assistance 2: Stand by assistance  Quality of Gait 2: steady gait with walker, no LOB  Distance: 120'  Comments: Reports feeling much safer with walker. Stairs/Curb  Stairs?: Yes (up/down 3 steps with B rails CGA- visual cues for where the steps were due to unable to look down with c-collar on)     Balance  Sitting - Static: Good  Standing - Static: Good  Standing - Dynamic: Good (with walker)    Treatment included gait/transfer training, pt education.       Plan   Plan  Times per week: 5-7  Current Treatment Recommendations: Balance Training, Functional Mobility Training, Transfer Training, Gait Training, Stair training, Safety Education & Training  Safety Devices  Type of devices: Call light within reach, Chair alarm in place, Nurse notified, Left in chair                                                      AM-PAC Score  AM-PAC Inpatient Mobility Raw Score : 18 (10/08/21 1412)  AM-PAC Inpatient T-Scale Score : 43.63 (10/08/21 1412)  Mobility Inpatient CMS 0-100% Score: 46.58 (10/08/21 1412)  Mobility Inpatient CMS G-Code Modifier : CK (10/08/21 1412)          Goals  Short term goals  Time Frame for Short term goals: dc  Short term goal 1: Bed Mobility S via logroll  Short term goal 2: Sit<>stand S  Short term goal 3: Ambulate 150' with RW S without LOB  Short term goal 4: up/down 2 steps with 1 rail SBA  Patient Goals   Patient goals : home at dc       Therapy Time   Individual Concurrent Group Co-treatment   Time In 1319         Time Out 1400         Minutes 41           Timed Code Treatment Minutes:   25    Total Treatment Minutes:  [place], [facility]

## 2021-10-08 NOTE — PROGRESS NOTES
Occupational Therapy   Occupational Therapy Initial Assessment/Treatment  Date: 10/8/2021   Patient Name: Shalom Sanabria  MRN: [id number]     : 1945    Date of Service: 10/8/2021    Discharge Recommendations:      Shalom Sanabria scored a 19/24 on the AM-PAC ADL Inpatient form. Current research shows that an AM-PAC score of 18 or greater is typically associated with a discharge to the patient's home setting. Please see assessment section for further patient specific details. If patient discharges prior to next session this note will serve as a discharge summary. Please see below for the latest assessment towards goals. OT Equipment Recommendations  Equipment Needed: No    Assessment   Performance deficits / Impairments: Decreased functional mobility ; Decreased ADL status; Decreased endurance;Decreased balance  Assessment: Pt presents with deficits in ADLs and functional mobility. At baseline, pt lives with  and completes ADLs and functional mobility independently. Currently, pt requires assist for ADLs and functional mobility 2/2 decreased endurance and neck pain. Pt would benefit from continued inpatient OT at this facility to maximize safety and independence at home. Treatment Diagnosis: Impaired functional mobility and ADL  Prognosis: Good  Decision Making: Medium Complexity  OT Education: OT Role;Plan of Care;Precautions; ADL Adaptive Strategies;Transfer Training  Patient Education: Pt demonstrated understanding of education  REQUIRES OT FOLLOW UP: Yes  Activity Tolerance  Activity Tolerance: Patient Tolerated treatment well  Safety Devices  Safety Devices in place: Not Applicable (Pt taken to xray directly after session)  Type of devices:  (Pt taken to xray directly after session.)         Treatment Diagnosis: Impaired functional mobility and ADL    Restrictions  Position Activity Restriction  Other position/activity restrictions: C collar (- Cervical collar to be worn at all times when OOB or if HOB >45 degrees - Cervical collar does NOT need to be worn when eating, bathing or showering - Cervical collar pads to be cleaned with soap & water, air dried, and changed daily - OK to put gauze over incision to keep cervical collar from rubbing, if needed)    Subjective   General  Chart Reviewed: Yes  Patient assessed for rehabilitation services?: Yes  Additional Pertinent Hx: Pt is 65 yo female admitted 10/7/21 after undergoing anterior cervical discectomy and fusion of C3/4, 4/5, and 5/6. PMH includes depression, L carpal tunnel syndrome, hypoglycemia, De Quervain's disease, sacroiliitis, presbyopia, nonexudative age-related macular degeneration, carpal tunnel release, cardiac stress test, lumbar spine surgery, and foot surgery x4. Family / Caregiver Present: No  Referring Practitioner: DINESH Sarmiento  Diagnosis: Cervical spinal stenosis  Subjective  Subjective: Pt in bed upon arrival. Pt stated she is doing well. Pain: Yes, 5/10 neck pain, nurse aware    Social/Functional History  Social/Functional History  Lives With: Spouse  Type of Home: House  Home Layout: Two level, Able to Live on Main level with bedroom/bathroom, Performs ADL's on one level, Laundry in basement  Home Access: Stairs to enter without rails  Entrance Stairs - Number of Steps: 2  Bathroom Shower/Tub: Walk-in shower  Bathroom Toilet: Handicap height  Bathroom Equipment: Grab bars in shower, Grab bars around toilet, Hand-held shower, Shower chair  Bathroom Accessibility: Accessible  Home Equipment: Rolling walker, Cane, [address] Help From: Family (Son and daughter are able to help)  ADL Assistance: Independent  Homemaking Assistance: Independent  Homemaking Responsibilities: Yes (Cleaning, cooking.  does laundry)  Ambulation Assistance: Independent  Transfer Assistance: Independent  Active :  Yes  Additional Comments: Pt denies falls     Objective   Vision: Impaired  Vision Exceptions: Wears glasses at all times  Hearing: Within functional limits    Orientation  Overall Orientation Status: Within Normal Limits     Balance  Sitting Balance: Supervision  Standing Balance: Stand by assistance  Standing Balance  Time: ~4min  Activity: Bathroom mobility, ADLs  Functional Mobility  Functional - Mobility Device: No device  Activity: To/from bathroom  Assist Level: Stand by assistance  Toilet Transfers  Toilet - Technique: Ambulating  Equipment Used: Standard toilet  Toilet Transfer: Stand by assistance  ADL  Grooming: Stand by assistance;Verbal cueing (Brushed teeth and wiped face while standing at sink with SBA. VCs for use of basin while brushing teeth.)  LE Dressing: Stand by assistance (Doffed socks by stepping on sock with other foot while standing with SBA, donned socks while seated using figure 4 technique with SBA.)  Toileting: Stand by assistance  Tone RUE  RUE Tone: Normotonic  Tone LUE  LUE Tone: Normotonic  Coordination  Movements Are Fluid And Coordinated: Yes     Bed mobility  Supine to Sit: Stand by assistance (HOB elevated, use of rail)  Scooting: Stand by assistance (Scooting to EOB and in chair)  Transfers  Stand Step Transfers: Stand by assistance  Sit to stand: Stand by assistance  Stand to sit: Stand by assistance     Cognition  Overall Cognitive Status: WFL        Sensation  Overall Sensation Status: Impaired (Pt reported numbness in upper LUE 2/2 nerve damage)      LUE AROM (degrees)  LUE AROM : WFL  RUE AROM (degrees)  RUE AROM : WFL  LUE Strength  LUE Strength Comment: Strength not formally assessed 2/2 cervical precautions. BUE appear to be [facility] during ADLs and sit to stand transfers. Hand Dominance  Hand Dominance: Right     Pt was seen for OT evaluation and treatment including ADL training and functional mobility.     Plan   Plan  Times per week: 5-7  Current Treatment Recommendations: Strengthening, ROM, Balance Training, Functional Mobility Training, Endurance Training, Safety Education & Training, Patient/Caregiver Education & Training, Self-Care / ADL    AM-PAC Score        AM-PAC Inpatient Daily Activity Raw Score: 19 (10/08/21 1054)  AM-PAC Inpatient ADL T-Scale Score : 40.22 (10/08/21 1054)  ADL Inpatient CMS 0-100% Score: 42.8 (10/08/21 1054)  ADL Inpatient CMS G-Code Modifier : CK (10/08/21 1054)    Goals   No goals met  Short term goals  Time Frame for Short term goals: Discharge  Short term goal 1: Pt will complete chair/toilet transfers with mod I  Short term goal 2: Pt will complete LBD with spvn and AE prn  Short term goal 3: Pt will stand x8min with spvn for ADLs  Patient Goals   Patient goals : Return home with family       Therapy Time   Individual Concurrent Group Co-treatment   Time In 0810         Time Out 0835         Minutes 25         Timed Code Treatment Minutes: 15 Minutes    Total Treatment Time: 25 Minutes    Ulises Mendieta OTS  A licensed therapist was present, directed the patient's care, made skilled judgement, and was responsible for assessment and treatment of the patient.

## 2021-10-08 NOTE — PROGRESS NOTES
Pt a/o. VSS. Pt had 10mL of serosanguinous fluid from NATHAN drain. Will continue monitor.      José Dunn, SN

## 2021-10-08 NOTE — PLAN OF CARE
Problem: Infection:  Goal: Will remain free from infection  Description: Will remain free from infection  Outcome: Ongoing  Incision will be kept dry, clean and intact. Incision will be closely monitored for any signs of early infection. Problem: Safety:  Goal: Free from accidental physical injury  Description: Free from accidental physical injury  Outcome: Ongoing  Bed will be in lowest position. Non skid socks will remain the patient's feet. Pt will use call light to use the restroom.

## 2021-10-08 NOTE — PLAN OF CARE
Problem: Infection:  Goal: Will remain free from infection  Description: Will remain free from infection  10/8/2021 0750 by Mirlande Little RN  Outcome: Ongoing    Problem: Safety:  Goal: Free from accidental physical injury  Description: Free from accidental physical injury  10/8/2021 0750 by Mirlande Little RN  Outcome: Ongoing     Problem: Daily Care:  Goal: Daily care needs are met  Description: Daily care needs are met  Outcome: Ongoing     Problem: Pain:  Goal: Patient's pain/discomfort is manageable  Description: Patient's pain/discomfort is manageable  Outcome: Ongoing     Problem: Skin Integrity:  Goal: Skin integrity will stabilize  Description: Skin integrity will stabilize  Outcome: Ongoing

## 2021-10-08 NOTE — PROGRESS NOTES
Pt is A&O, VSS, pain is being managed with pain medicine per MAR. Incision site CDI. NATHAN drain in place, draining serosanguinous drainage, Collar remains in place. All fall precaution is in place. Call light is within the reach.

## 2021-10-08 NOTE — PROGRESS NOTES
Problem List    Diagnosis Date Noted    Cervical stenosis of spinal canal 10/07/2021       Assessment:  Patient is a 64 y.o. female s/p Procedure(s) (LRB):  ANTERIOR CERVICAL DISCECTOMY AND  FUSION C3/4, C4/5, C5/6 (N/A)    Plan:  1. Neurologic exams frequency: Q4H  2. For change in exam MUST contact neurosurgery team along with critical care or primary team  3. Cervical spinal stenosis:  - s/p C3-6 ACDF  - Continue drain until output <30 mL Q8H  - XR Cervical shows no complications  4. Bowel Regimen: Glycolax and Senokot-S  5. Pain control: Tylenol & PRN Roxicodone and Dilaudid  6. Muscle spasms: PRN Robaxin  7. Mobility: PT/OT as tolerates  8. Diet: Advance as tolerates  9. DVT Prophylaxis: SCD's & SQ Heparin  10. Please call with any questions or decline in neurological status    DISPO: Remain inpatient until drain output has decreased, pain controlled with PO meds and evaluated by PT/OT. Patient was discussed with Dr. Laura Bella who agrees with above assessment and plan.      Electronically signed by: FAY Douglas CNP, APRN-CNP, 10/8/2021 11:31 AM  [phone]

## 2021-10-09 VITALS
OXYGEN SATURATION: 95 % | HEART RATE: 95 BPM | SYSTOLIC BLOOD PRESSURE: 133 MMHG | RESPIRATION RATE: 16 BRPM | TEMPERATURE: 97.5 F | BODY MASS INDEX: 36.37 KG/M2 | WEIGHT: 213 LBS | DIASTOLIC BLOOD PRESSURE: 75 MMHG | HEIGHT: 64 IN

## 2021-10-09 PROCEDURE — 6360000002 HC RX W HCPCS: Performed by: INTERNAL MEDICINE

## 2021-10-09 PROCEDURE — 6370000000 HC RX 637 (ALT 250 FOR IP): Performed by: NURSE PRACTITIONER

## 2021-10-09 PROCEDURE — G0378 HOSPITAL OBSERVATION PER HR: HCPCS

## 2021-10-09 PROCEDURE — 6360000002 HC RX W HCPCS: Performed by: PHYSICIAN ASSISTANT

## 2021-10-09 PROCEDURE — 97110 THERAPEUTIC EXERCISES: CPT

## 2021-10-09 PROCEDURE — 6370000000 HC RX 637 (ALT 250 FOR IP): Performed by: PHYSICIAN ASSISTANT

## 2021-10-09 PROCEDURE — 97116 GAIT TRAINING THERAPY: CPT

## 2021-10-09 PROCEDURE — 2580000003 HC RX 258: Performed by: PHYSICIAN ASSISTANT

## 2021-10-09 PROCEDURE — 97530 THERAPEUTIC ACTIVITIES: CPT

## 2021-10-09 PROCEDURE — 2500000003 HC RX 250 WO HCPCS: Performed by: PHYSICIAN ASSISTANT

## 2021-10-09 RX ORDER — METHOCARBAMOL 500 MG/1
500 TABLET, FILM COATED ORAL 4 TIMES DAILY PRN
Qty: 60 TABLET | Refills: 0 | Status: SHIPPED | OUTPATIENT
Start: 2021-10-09 | End: 2021-10-09

## 2021-10-09 RX ORDER — HYDRALAZINE HYDROCHLORIDE 20 MG/ML
10 INJECTION INTRAMUSCULAR; INTRAVENOUS ONCE
Status: COMPLETED | OUTPATIENT
Start: 2021-10-09 | End: 2021-10-09

## 2021-10-09 RX ORDER — OXYCODONE HYDROCHLORIDE 5 MG/1
5 TABLET ORAL EVERY 4 HOURS PRN
Qty: 75 TABLET | Refills: 0
Start: 2021-10-09 | End: 2021-10-22

## 2021-10-09 RX ORDER — METHOCARBAMOL 500 MG/1
500 TABLET, FILM COATED ORAL 4 TIMES DAILY PRN
Qty: 60 TABLET | Refills: 0 | Status: SHIPPED | OUTPATIENT
Start: 2021-10-09 | End: 2021-10-19

## 2021-10-09 RX ADMIN — HEPARIN SODIUM 5000 UNITS: 5000 INJECTION INTRAVENOUS; SUBCUTANEOUS at 06:37

## 2021-10-09 RX ADMIN — LOSARTAN POTASSIUM 100 MG: 25 TABLET, FILM COATED ORAL at 08:22

## 2021-10-09 RX ADMIN — FAMOTIDINE 20 MG: 10 INJECTION, SOLUTION INTRAVENOUS at 08:22

## 2021-10-09 RX ADMIN — Medication 10 ML: at 08:23

## 2021-10-09 RX ADMIN — OXYCODONE 5 MG: 5 TABLET ORAL at 03:16

## 2021-10-09 RX ADMIN — ACETAMINOPHEN 650 MG: 325 TABLET ORAL at 12:12

## 2021-10-09 RX ADMIN — DOCUSATE SODIUM 50 MG AND SENNOSIDES 8.6 MG 1 TABLET: 8.6; 5 TABLET, FILM COATED ORAL at 08:22

## 2021-10-09 RX ADMIN — OXYCODONE 10 MG: 5 TABLET ORAL at 12:11

## 2021-10-09 RX ADMIN — POLYETHYLENE GLYCOL 3350 17 G: 17 POWDER, FOR SOLUTION ORAL at 08:22

## 2021-10-09 RX ADMIN — ACETAMINOPHEN 650 MG: 325 TABLET ORAL at 06:34

## 2021-10-09 RX ADMIN — METHOCARBAMOL 500 MG: 500 TABLET ORAL at 08:48

## 2021-10-09 RX ADMIN — HYDRALAZINE HYDROCHLORIDE 10 MG: 20 INJECTION, SOLUTION INTRAMUSCULAR; INTRAVENOUS at 03:07

## 2021-10-09 ASSESSMENT — PAIN - FUNCTIONAL ASSESSMENT
PAIN_FUNCTIONAL_ASSESSMENT: PREVENTS OR INTERFERES SOME ACTIVE ACTIVITIES AND ADLS
PAIN_FUNCTIONAL_ASSESSMENT: PREVENTS OR INTERFERES SOME ACTIVE ACTIVITIES AND ADLS

## 2021-10-09 ASSESSMENT — PAIN DESCRIPTION - ORIENTATION
ORIENTATION: RIGHT;LEFT;POSTERIOR
ORIENTATION: ANTERIOR

## 2021-10-09 ASSESSMENT — PAIN SCALES - GENERAL
PAINLEVEL_OUTOF10: 2
PAINLEVEL_OUTOF10: 7
PAINLEVEL_OUTOF10: 4
PAINLEVEL_OUTOF10: 7

## 2021-10-09 ASSESSMENT — PAIN DESCRIPTION - ONSET
ONSET: ON-GOING
ONSET: ON-GOING

## 2021-10-09 ASSESSMENT — PAIN DESCRIPTION - LOCATION
LOCATION: NECK;SHOULDER;BACK
LOCATION: NECK;SHOULDER

## 2021-10-09 ASSESSMENT — PAIN DESCRIPTION - PROGRESSION
CLINICAL_PROGRESSION: GRADUALLY WORSENING

## 2021-10-09 ASSESSMENT — PAIN DESCRIPTION - FREQUENCY
FREQUENCY: CONTINUOUS
FREQUENCY: CONTINUOUS

## 2021-10-09 ASSESSMENT — PAIN DESCRIPTION - PAIN TYPE
TYPE: ACUTE PAIN;SURGICAL PAIN
TYPE: SURGICAL PAIN

## 2021-10-09 ASSESSMENT — PAIN DESCRIPTION - DESCRIPTORS
DESCRIPTORS: ACHING
DESCRIPTORS: ACHING

## 2021-10-09 NOTE — PROGRESS NOTES
Alert oriented times 4 up in chair , gait steady to bathroom , LIAM.CONNOR activated , meeting pain goal with oral med's , possible dc home today , working with therapy

## 2021-10-09 NOTE — DISCHARGE SUMMARY
Physician Discharge Summary     Patient ID:  Kendal Hogan  [id number]  64 y.o.  1945    Admit date: 10/7/2021    Discharge date and time: No discharge date for patient encounter. Admitting Physician: Ben Chandler MD     Discharge Physician: Sia Molina    Admission Diagnoses: Cervical spinal stenosis [M48.02]  Cervical stenosis of spinal canal [M48.02]    Discharge Diagnoses: Same    Admission Condition: good    Discharged Condition: good    Indication for Admission: Surgery for Cervical stenosis    Hospital Course: Patient underwent uncomplicated ACDF  POstop pain control  Tolerating PO  Cleared by PT  Voiding     Consults: PT and OT  Significant Diagnostic Studies: xrays    Treatments: surgery: ACDF C3-4-5-6    Discharge Exam:  Neurological:  Mental Status: Awake, alert, oriented x 4, speech clear and appropriate  Attention: Intact  Language: No aphasia or dysarthria noted  Sensation: Intact to all extremities to light touch  Coordination: Intact     Musculoskeletal:   Gait: Not tested   Assist devices: None   Tone: Normal  Motor strength:     Right  Left      Right  Left    Deltoid  5 5   Hip Flex  5 5   Biceps  5 5   Knee Extensors  5 5   Triceps  5 5   Knee Flexors  5 5   Wrist Ext  5 5   Ankle Dorsiflex. 5 5   Wrist Flex  5 5   Ankle Plantarflex. 5 5   Handgrip  5 5   Ext Noah Longus  5 5   Thumb Ext  5 5              Incision: CDI              Disposition: home    In process/preliminary results:  Outstanding Order Results     No orders found from 9/8/2021 to 10/8/2021. Patient Instructions:   Current Discharge Medication List      START taking these medications    Details   oxyCODONE (ROXICODONE) 5 MG immediate release tablet Take 1 tablet by mouth every 4 hours as needed for Pain (q 4 hours PRN pain) for up to 13 days.   Qty: 75 tablet, Refills: 0    Comments: Reduce doses taken as pain becomes manageable  Associated Diagnoses: Cervical stenosis of spinal canal      methocarbamol (ROBAXIN) 500 MG tablet Take 1 tablet by mouth 4 times daily as needed (spasms)  Qty: 60 tablet, Refills: 0         CONTINUE these medications which have NOT CHANGED    Details   losartan (COZAAR) 100 MG tablet Take 100 mg by mouth daily      vitamin E 400 UNIT capsule Take 400 Units by mouth daily           Activity: activity as tolerated with postop restrictions  Diet: regular diet  Wound Care: keep wound clean and dry    Follow-up with Dr Rula Mendes in 2 weeks.     Helder Treadwell  10/9/2021  11:13 AM

## 2021-10-09 NOTE — PROGRESS NOTES
.  Aracely Weiss is a 64 y.o. female patient.   S/p ACDF    No issues overnight    Current Facility-Administered Medications   Medication Dose Route Frequency Provider Last Rate Last Admin    losartan (COZAAR) tablet 100 mg  100 mg Oral Daily DINESH Parsons   100 mg at 10/09/21 1092    sodium chloride flush 0.9 % injection 10 mL  10 mL IntraVENous 2 times per day DINESH Parsons   10 mL at 10/09/21 0823    sodium chloride flush 0.9 % injection 10 mL  10 mL IntraVENous PRN DINESH Parsons        0.9 % sodium chloride infusion  25 mL IntraVENous PRN DINESH Parsons        acetaminophen (TYLENOL) tablet 650 mg  650 mg Oral Q6H DINESH Parsons   650 mg at 10/09/21 6850    oxyCODONE (ROXICODONE) immediate release tablet 5 mg  5 mg Oral Q4H PRN DINESH Parsons   5 mg at 10/09/21 0316    Or    oxyCODONE (ROXICODONE) immediate release tablet 10 mg  10 mg Oral Q4H PRN DINESH Parsons        HYDROmorphone (DILAUDID) injection 0.25 mg  0.25 mg IntraVENous Q3H PRN IDNESH Parsons        Or    HYDROmorphone (DILAUDID) injection 0.5 mg  0.5 mg IntraVENous Q3H PRN DINESH Parsons        methocarbamol (ROBAXIN) tablet 500 mg  500 mg Oral 4x Daily PRN DINESH Parsons   500 mg at 10/09/21 0848    sennosides-docusate sodium (SENOKOT-S) 8.6-50 MG tablet 1 tablet  1 tablet Oral BID DINESH Parsons   1 tablet at 10/09/21 9612    fleet rectal enema 1 enema  1 enema Rectal Daily PRN DINESH Parsons        promethazine (PHENERGAN) tablet 12.5 mg  12.5 mg Oral Q6H PRN DINESH Parsons        Or    ondansetron [facility]) injection 4 mg  4 mg IntraVENous Q6H PRN DINESH Parsons        famotidine (PEPCID) injection 20 mg  20 mg IntraVENous BID DINESH Parsons   20 mg at 10/09/21 5076    polyethylene glycol (GLYCOLAX) packet 17 g  17 g Oral Daily FAY Morris - CNP   17 g at 10/09/21 6756    hydrALAZINE (APRESOLINE) injection 10 mg  10 mg IntraVENous Q6H PRN FAY Gonzales - CNP   10 mg at 10/08/21 2246    heparin (porcine) injection 5,000 Units  5,000 Units SubCUTAneous Q8H Nickie Roberson   5,000 Units at 10/09/21 8473    benzonatate (TESSALON) capsule 100 mg  100 mg Oral TID PRN Betty Jackson MD   100 mg at 10/08/21 0529    phenol 1.4 % mouth spray 1 spray  1 spray Mouth/Throat Q2H PRN Betty Jackson MD   1 spray at 10/07/21 2008     Allergies   Allergen Reactions    Latex Dermatitis    Sulfa Antibiotics Other (See Comments)     Nose bleeds     Active Problems:    Cervical stenosis of spinal canal  Resolved Problems:    * No resolved hospital problems. *    Blood pressure (!) 150/75, pulse 82, temperature 97.5 °F (36.4 °C), temperature source Oral, resp. rate 19, height 5' 4\" (1.626 m), weight 213 lb (96.6 kg), SpO2 94 %. Neurological:  Mental Status: Awake, alert, oriented x 4, speech clear and appropriate  Attention: Intact  Language: No aphasia or dysarthria noted  Sensation: Intact to all extremities to light touch  Coordination: Intact     Musculoskeletal:   Gait: Not tested   Assist devices: None   Tone: Normal  Motor strength:     Right  Left      Right  Left    Deltoid  5 5   Hip Flex  5 5   Biceps  5 5   Knee Extensors  5 5   Triceps  5 5   Knee Flexors  5 5   Wrist Ext  5 5   Ankle Dorsiflex. 5 5   Wrist Flex  5 5   Ankle Plantarflex.   5 5   Handgrip  5 5   Ext Noah Longus  5 5   Thumb Ext  5 5              Incision: CDI     Drain: 65 mL in past 24 hours     Radiological Findings:  XR CERVICAL SPINE (2-3 VIEWS)  Result Date: 10/8/2021  Status post ACDF from C3 to C6 without evidence of hardware complication.         Labs:  No results for input(s): WBC, HGB, HCT, PLT in the last 72 hours.     No results for input(s): NA, K, CL, CO2, BUN, CREATININE, GLUCOSE, CALCIUM, PHOS, MG in the last 72 hours.     No results for input(s): PROTIME, INR, APTT in the last 72 hours.          Patient Active Problem List     Diagnosis Date Noted    Cervical stenosis of spinal canal 10/07/2021         Assessment:  Patient is a 64 y.o. female s/p Procedure(s) (LRB):  ANTERIOR CERVICAL DISCECTOMY AND  FUSION C3/4, C4/5, C5/6 (N/A)     Plan:  1. Neurologic exams frequency: Q4H  2. For change in exam MUST contact neurosurgery team along with critical care or primary team  3. Cervical spinal stenosis:  - s/p C3-6 ACDF  - d/c drain  - XR Cervical shows no complications  4. Bowel Regimen: Glycolax and Senokot-S  5. Pain control: Tylenol & PRN Roxicodone and Dilaudid  6. Muscle spasms: PRN Robaxin  7. Mobility: PT/OT as tolerates  8. Diet: Advance as tolerates  9. DVT Prophylaxis: SCD's & SQ Heparin  10.  Please call with any questions or decline in neurological status     DISPO: John Terrell MD  10/9/2021

## 2021-10-09 NOTE — PROGRESS NOTES
Drain out , tolerated well , [place] collar on , voiced understanding of dc instructions and med's to car per wheel chair

## 2021-10-09 NOTE — DISCHARGE INSTR - COC
Continuity of Care Form    Patient Name: Christian Blake   :  1945  MRN:  [id number]    Admit date:  10/7/2021  Discharge date:  ***    Code Status Order: Full Code   Advance Directives:   Advance Care Flowsheet Documentation     Date/Time Healthcare Directive Type of Healthcare Directive Copy in [address] Agent's Name Healthcare Agent's Phone Number    10/07/21 7148  Unknown, patient unable to respond due to medical condition  Lisa Verma 157          Admitting Physician:  Anel Abreu MD  PCP: Yue Siddiqui MD    Discharging Nurse: [facility] Unit/Room#: 9493/3220-61  Discharging Unit Phone Number: ***    Emergency Contact:   Extended Emergency Contact Information  Primary Emergency Contact: leila franklin  Home Phone: [phone]  Relation: Spouse  Secondary Emergency Contact: rigobertocandido  Home Phone: [phone]  Relation: Child    Past Surgical History:  Past Surgical History:   Procedure Laterality Date    CERVICAL FUSION N/A 10/7/2021    ANTERIOR CERVICAL DISCECTOMY AND  FUSION C3/4, C4/5, C5/6 performed by Anel Abreu MD at [address]       Immunization History: There is no immunization history on file for this patient.     Active Problems:  Patient Active Problem List   Diagnosis Code    Cervical stenosis of spinal canal M48.02       Isolation/Infection:   Isolation          No Isolation        Patient Infection Status     None to display          Nurse Assessment:  Last Vital Signs: BP (!) 150/75   Pulse 82   Temp 97.5 °F (36.4 °C) (Oral)   Resp 19   Ht 5' 4\" (1.626 m)   Wt 213 lb (96.6 kg)   LMP  (LMP Unknown)   SpO2 94%   BMI 36.56 kg/m²     Last documented pain score (0-10 scale): Pain Level: 2  Last Weight:   Wt Readings from Last 1 Encounters:   10/07/21 213 lb (96.6 kg)     Mental Status:  {IP PT MENTAL STATUS:}    IV Access:  { DEE IV ACCESS:543231670}    Nursing Mobility/ADLs:  Walking   {[facility] DME NBDZ:442467713}  Transfer  {[facility] DME AXNS:552130564}  Bathing  {[facility] DME MVBK:740905050}  Dressing  {CHP DME ZJO}  Toileting  {[facility]P DME NDHK:504938417}  Feeding  {[facility] DME STBF:462346041}  Med Admin  {[facility] DME ZROE:305292088}  Med Delivery   { DEE MED Delivery:679841286}    Wound Care Documentation and Therapy:        Elimination:  Continence:   · Bowel: {YES / LM:90482}  · Bladder: {YES / RZ:73591}  Urinary Catheter: {Urinary Catheter:256150916}   Colostomy/Ileostomy/Ileal Conduit: {YES / CA:94706}       Date of Last BM: ***    Intake/Output Summary (Last 24 hours) at 10/9/2021 1210  Last data filed at 10/9/2021 0600  Gross per 24 hour   Intake 870 ml   Output 315 ml   Net 555 ml     I/O last 3 completed shifts: In: 6536 [P.O.:1530;  I.V.:40]  Out: 635 [Urine:600; Drains:35]    Safety Concerns:     [employer] Safety Concerns:472004845}    Impairments/Disabilities:      [employer] Impairments/Disabilities:548155773}    Nutrition Therapy:  Current Nutrition Therapy:   [employer] Diet List:333306089}    Routes of Feeding: {CHP DME Other Feedings:180551386}  Liquids: {Slp liquid thickness:27541}  Daily Fluid Restriction: {CHP DME Yes amt example:248336507}  Last Modified Barium Swallow with Video (Video Swallowing Test): {Done Not Done CEXO:950199871}    Treatments at the Time of Hospital Discharge:   Respiratory Treatments: ***  Oxygen Therapy:  {Therapy; copd oxygen:93275}  Ventilator:    { CC Vent BTKH:573977005}    Rehab Therapies: {THERAPEUTIC INTERVENTION:7653843371}  Weight Bearing Status/Restrictions: [facility] [employer]  Weight Bearin}  Other Medical Equipment (for information only, NOT a DME order):  {EQUIPMENT:418163242}  Other Treatments: ***    Patient's personal belongings (please select all that are sent with patient):  {FERNANDO DME Belongings:102506756}    RN SIGNATURE:  {Esignature:393711539}    CASE MANAGEMENT/SOCIAL WORK SECTION    Inpatient Status Date: ***    Readmission Risk Assessment Score:  Readmission Risk              Risk of Unplanned Readmission:  7           Discharging to Facility/ [address] Details  FAX            [address] [address] Chani Arguello, [address] [place] [address]       Phone: [phone]       Fax: [phone]        ·     / signature: Electronically signed by CLAUDIO Felton, IZZYW on 10/9/21 at 12:10 PM EDT    PHYSICIAN SECTION    Prognosis: {Prognosis:5694986405}    Condition at Discharge: [address] [place] [address] Patient Condition:19433}    Rehab Potential (if transferring to Rehab): {Prognosis:7793319565}    Recommended Labs or Other Treatments After Discharge: ***    Physician Certification: I certify the above information and transfer of Reji Liz  is necessary for the continuing treatment of the diagnosis listed and that she requires {Admit to Appropriate Level of Care:68758} for {GREATER/LESS:205260214} 30 days.      Update Admission H&P: {CHP DME Changes in LGKSX:335418169}    PHYSICIAN SIGNATURE:  {Esignature:759361996}

## 2021-10-09 NOTE — CARE COORDINATION
Case Management Assessment            Discharge Note                    Date / Time of Note: 10/9/2021 12:09 PM                  Discharge Note Completed by: CLAUDIO Oropeza, [place]    Patient Name: Kemal Otoole   YOB: 1945  Diagnosis: Cervical spinal stenosis [M48.02]  Cervical stenosis of spinal canal [M48.02]   Date / Time: 10/7/2021  5:26 AM    Current PCP: Dimitri Sarmiento MD  Clinic patient: No    Hospitalization in the last 30 days: No    Advance Directives:  Code Status: Full Code  [place] DNR form completed and on chart: No    Financial:  Payor: Amy Gupta / Plan: Tim Claros ESSENTIAL/PLUS / Product Type: *No Product type* /      Pharmacy:    [facility]/pharmacy #[id number]- [facility], [address] [place] [address] MARY Mix [phone] - F [phone]  [address] [place] [address] SAFIA Benz [address]  Phone: [phone] Fax: [phone]      Assistance purchasing medications?: Potential Assistance Purchasing Medications: No  Assistance provided by Case Management: None at this time    Does patient want to participate in local refill/ meds to beds program?: No    Meds To Beds General Rules:  1. Can ONLY be done Monday- Friday between 8:30am-5pm  2. Prescription(s) must be in pharmacy by 3pm to be filled same day  3. Copy of patient's insurance/ prescription drug card and patient face sheet must be sent along with the prescription(s)  4. Cost of Rx cannot be added to hospital bill. If financial assistance is needed, please contact unit  or ;  or  CANNOT provide pharmacy voucher for patients co-pays  5.  Patients can then  the prescription on their way out of the hospital at discharge, or pharmacy can deliver to the bedside if staff is available. (payment due at time of pick-up or delivery - cash, check, or card accepted)     Able to afford home medications/ co-pay costs: Yes    ADLS:  Current PT AM-PAC Score: 18 /24  Current OT AM-PAC Score: 19 /24      DISCHARGE Disposition: Home with Home Health Care: [address]     LOC at discharge: Not Applicable  DEE Completed: Yes    Notification completed in HENS/PAS?:  Not Applicable    IMM Completed:   Not Indicated    Transportation:  Transportation PLAN for discharge: family   Mode of Transport: [address] ordered at discharge: Yes  [address]: [place]  Phone: [phone]  Fax: [phone]  Orders faxed: Yes    Durable Medical Equipment:  DME Provider: None  Equipment obtained during hospitalization:     Home Oxygen and Respiratory Equipment:  Oxygen needed at discharge?: No  [address]: Not Applicable  Portable tank available for discharge?: Not Indicated    Dialysis:  Dialysis patient: No    Dialysis Center:  Not Applicable    Additional CM Notes: Pt from home w/SO, PT to return home and resume HHC through [address], Pts family will transport home at DC. The Plan for Transition of Care is related to the following treatment goals of Cervical spinal stenosis [M48.02]  Cervical stenosis of spinal canal [M48.02]    The Patient and/or patient representative Kayli Robert and her family were provided with a choice of provider and agrees with the discharge plan Yes    Freedom of choice list was provided with basic dialogue that supports the patient's individualized plan of care/goals and shares the quality data associated with the providers.  Yes    Care Transitions patient: No    CLAUDIO Fitzgerald, [facility]  Case Management Department  Ph: [phone]  Fax: [phone]

## 2021-10-09 NOTE — PROGRESS NOTES
Physical Therapy  Facility/Department: [facility] 5T ORTHO/NEURO  Daily Treatment Note  NAME: Noe Nesbitt  : 1945  MRN: [id number]    Date of Service: 10/9/2021    Discharge Recommendations:  Noe Nesbitt scored a 18/24 on the AM-PAC short mobility form. Current research shows that an AM-PAC score of 18 or greater is typically associated with a discharge to the patient's home setting. Based on the patient's AM-PAC score and their current functional mobility deficits, it is recommended that the patient have 2-3 sessions per week of Physical Therapy at d/c to increase the patient's independence. At this time, this patient demonstrates the endurance and safety to discharge home with OPPT and a follow up treatment frequency of 2-3x/wk. Please see assessment section for further patient specific details. If patient discharges prior to next session this note will serve as a discharge summary. Please see below for the latest assessment towards goals. PT Equipment Recommendations  Equipment Needed: No (pt has RW at home)    Assessment   Body structures, Functions, Activity limitations: Decreased functional mobility ; Decreased balance  Assessment: Holly Reeves is progressing well with therapy & towards goals. 2 STGs met this date & updated. She was able to progress ambulation distances today & demo's safe, appropriate use of RW with no cues.  She plans to return home with support from her spouse & will benefit from OPPT services to continue to maximize her functional independence  Treatment Diagnosis: impaired mobility  Prognosis: Good  PT Education: Goals;PT Role;Plan of Care;General Safety;Gait Training;Transfer Training  Patient Education: pt v.u  Barriers to Learning: n/a  REQUIRES PT FOLLOW UP: Yes  Activity Tolerance  Activity Tolerance: Patient Tolerated treatment well     Patient Diagnosis(es): The encounter diagnosis was Cervical stenosis of spinal canal.     has no past medical history on file.   has a past surgical history that includes cervical fusion (N/A, 10/7/2021). Restrictions  Position Activity Restriction  Other position/activity restrictions: C collar (- Cervical collar to be worn at all times when OOB or if HOB >45 degrees - Cervical collar does NOT need to be worn when eating, bathing or showering - Cervical collar pads to be cleaned with soap & water, air dried, and changed daily - OK to put gauze over incision to keep cervical collar from rubbing, if needed)  Subjective   General  Chart Reviewed: Yes  Additional Pertinent Hx: s/p ANTERIOR CERVICAL DISCECTOMY AND  FUSION C3/4, C4/5, C5/6 on 10/7. Family / Caregiver Present: No  Referring Practitioner: DINESH London  Subjective  Subjective: Pt agreeable to PT tx. Very motivated to participate  General Comment  Comments: Pt resting in bedside chair upon PT arrival.  Pain Screening  Patient Currently in Pain: Yes (no rating. +soreness like pain of the shoulders & neck. RN aware.)  Vital Signs  Patient Currently in Pain: Yes (no rating. +soreness like pain of the shoulders & neck. RN aware.)       Orientation  Orientation  Overall Orientation Status: Within Normal Limits  Cognition   Cognition  Overall Cognitive Status: WFL  Objective      Transfers  Sit to Stand: Supervision  Stand to sit: Supervision  Ambulation  Ambulation?: Yes  WB Status: na  Ambulation 1  Surface: level tile  Device: Rolling Walker  Assistance: Supervision  Quality of Gait: NBOS with short step lengths (B) with a slow & steady step through pattern. No overt LOB  Distance: >500' & short bouts in hospital room  Comments: Pt demo's +safety & appropriate use of RW. +fatigue with ambulation requiring x 2 seated therapeutic rest breaks. (pt reports being a long hauler from [employer])  Stairs  # Steps : 4  Stairs Height: 6\"  Rails: Right ascending  Assistance: Supervision;Contact guard assistance  Comment: Pt ascends/descends stairs with a reciprocal pattern.  +slow pace maintained 2/2 pt unable to fully visualize steps 2/2 c-collar. +safety awareness with task.  No LOB     Balance  Posture: Fair  Sitting - Static:  (I)  Sitting - Dynamic:  (I)  Standing - Static:  (S with RW)  Standing - Dynamic:  (S with RW)  Exercises  Hip Flexion: x10 repetitions BLE in seated position  Knee Long Arc Quad: x 10 repetitions BLE                 AM-PAC Score  AM-PAC Inpatient Mobility Raw Score : 18 (10/09/21 1121)  AM-PAC Inpatient T-Scale Score : 43.63 (10/09/21 1121)  Mobility Inpatient CMS 0-100% Score: 46.58 (10/09/21 1121)  Mobility Inpatient CMS G-Code Modifier : CK (10/09/21 1121)          Goals  Short term goals  Time Frame for Short term goals: dc  Short term goal 1: Bed Mobility S via logroll--ongoing  Short term goal 2: Sit<>stand S--goal met & updated--Pt will complete sit<>stand txf I  Short term goal 3: Ambulate 150' with RW S without LOB--pt will ambulate >200' via Mod I with LRAD  Short term goal 4: up/down 2 steps with 1 rail SBA--ongoing  Patient Goals   Patient goals : home at [address] per week: 5-7  Current Treatment Recommendations: Balance Training, Functional Mobility Training, Transfer Training, Gait Training, Stair training, Safety Education & Training  Safety Devices  Type of devices: Call light within reach, Chair alarm in place, Left in chair  Restraints  Initially in place: No     Therapy Time   Individual Concurrent Group Co-treatment   Time In 1013         Time Out 1106         Minutes 53         Timed Code Treatment Minutes: [address] Reyes, PT

## 2021-10-09 NOTE — PROGRESS NOTES
Patient is alert and oriented x 4. Calls out appropriately. Surgical site to the anterior neck is CD&I KHLOE and sealed with surgical glue. NATHAN drain in place with 10 mL of serosanguinous drainage noted. Pain is controlled with PRN oxycodone and scheduled Tylenol. Neuro checks remain unchanged. Patient denies dizziness or headache. Patient is voiding per BRP. Vital signs remain stable with exception to BP which was 342 systolic at last check. 10 mg of hydralazine given IV. Will check BP at a later time. Will continue to assess and monitor.
